# Patient Record
Sex: FEMALE | Race: WHITE | Employment: OTHER | URBAN - NONMETROPOLITAN AREA
[De-identification: names, ages, dates, MRNs, and addresses within clinical notes are randomized per-mention and may not be internally consistent; named-entity substitution may affect disease eponyms.]

---

## 2014-10-09 LAB
CREATININE, URINE: NORMAL
MICROALBUMIN/CREAT 24H UR: 2.9 MG/G{CREAT}
MICROALBUMIN/CREAT UR-RTO: NORMAL

## 2018-03-02 LAB — DIABETIC RETINOPATHY: NORMAL

## 2018-11-06 LAB
AVERAGE GLUCOSE: NORMAL
CHOLESTEROL, TOTAL: 129 MG/DL
CHOLESTEROL/HDL RATIO: 2.5
HBA1C MFR BLD: 6 %
HDLC SERPL-MCNC: 52 MG/DL (ref 35–70)
LDL CHOLESTEROL CALCULATED: 55 MG/DL (ref 0–160)
TRIGL SERPL-MCNC: 134 MG/DL
VLDLC SERPL CALC-MCNC: NORMAL MG/DL

## 2019-03-12 VITALS
BODY MASS INDEX: 31.67 KG/M2 | DIASTOLIC BLOOD PRESSURE: 80 MMHG | HEIGHT: 68 IN | WEIGHT: 209 LBS | TEMPERATURE: 97.8 F | SYSTOLIC BLOOD PRESSURE: 130 MMHG | HEART RATE: 68 BPM

## 2019-03-12 RX ORDER — LEVOTHYROXINE SODIUM 0.1 MG/1
100 TABLET ORAL DAILY
COMMUNITY
End: 2019-06-24 | Stop reason: SDUPTHER

## 2019-03-12 RX ORDER — ASCORBIC ACID 500 MG
500 TABLET ORAL DAILY
COMMUNITY

## 2019-03-12 RX ORDER — LATANOPROST 50 UG/ML
SOLUTION/ DROPS OPHTHALMIC NIGHTLY
COMMUNITY
End: 2019-11-22

## 2019-03-12 RX ORDER — DORZOLAMIDE HYDROCHLORIDE AND TIMOLOL MALEATE 20; 5 MG/ML; MG/ML
SOLUTION/ DROPS OPHTHALMIC 2 TIMES DAILY
COMMUNITY
End: 2020-02-13

## 2019-03-12 RX ORDER — SIMVASTATIN 20 MG
20 TABLET ORAL NIGHTLY
COMMUNITY
End: 2019-06-24 | Stop reason: SDUPTHER

## 2019-05-10 ENCOUNTER — TELEPHONE (OUTPATIENT)
Dept: FAMILY MEDICINE CLINIC | Age: 69
End: 2019-05-10

## 2019-05-10 DIAGNOSIS — E55.9 VITAMIN D DEFICIENCY: ICD-10-CM

## 2019-05-10 DIAGNOSIS — R73.01 IMPAIRED FASTING BLOOD SUGAR: ICD-10-CM

## 2019-05-10 DIAGNOSIS — E04.9 THYROID GOITER: ICD-10-CM

## 2019-05-10 DIAGNOSIS — E78.2 MIXED HYPERLIPIDEMIA: Primary | ICD-10-CM

## 2019-05-10 NOTE — TELEPHONE ENCOUNTER
Returned call to patient. Pt said she went to lab Monday, there wasn't an order, so didn't have labs done.

## 2019-05-13 ENCOUNTER — HOSPITAL ENCOUNTER (OUTPATIENT)
Age: 69
Discharge: HOME | End: 2019-05-15
Payer: COMMERCIAL

## 2019-05-13 ENCOUNTER — OFFICE VISIT (OUTPATIENT)
Dept: FAMILY MEDICINE CLINIC | Age: 69
End: 2019-05-13
Payer: COMMERCIAL

## 2019-05-13 VITALS
RESPIRATION RATE: 16 BRPM | TEMPERATURE: 97.8 F | OXYGEN SATURATION: 96 % | WEIGHT: 214 LBS | HEART RATE: 74 BPM | BODY MASS INDEX: 33.59 KG/M2 | DIASTOLIC BLOOD PRESSURE: 78 MMHG | HEIGHT: 67 IN | SYSTOLIC BLOOD PRESSURE: 112 MMHG

## 2019-05-13 DIAGNOSIS — E03.9 ACQUIRED HYPOTHYROIDISM: ICD-10-CM

## 2019-05-13 DIAGNOSIS — H40.9 GLAUCOMA OF RIGHT EYE, UNSPECIFIED GLAUCOMA TYPE: ICD-10-CM

## 2019-05-13 DIAGNOSIS — J30.2 SEASONAL ALLERGIES: ICD-10-CM

## 2019-05-13 DIAGNOSIS — E04.9 THYROID GOITER: ICD-10-CM

## 2019-05-13 DIAGNOSIS — R73.01 IMPAIRED FASTING BLOOD SUGAR: ICD-10-CM

## 2019-05-13 DIAGNOSIS — E78.2 MIXED HYPERLIPIDEMIA: ICD-10-CM

## 2019-05-13 DIAGNOSIS — E55.9 VITAMIN D DEFICIENCY: Primary | ICD-10-CM

## 2019-05-13 DIAGNOSIS — E55.9 VITAMIN D DEFICIENCY: ICD-10-CM

## 2019-05-13 PROBLEM — E78.5 HYPERLIPIDEMIA: Status: ACTIVE | Noted: 2019-05-13

## 2019-05-13 LAB
ALBUMIN SERPL-MCNC: 4.6 G/DL (ref 3.5–5.2)
ALP BLD-CCNC: 78 U/L (ref 35–104)
ALT SERPL-CCNC: 19 U/L (ref 0–32)
ANION GAP SERPL CALCULATED.3IONS-SCNC: 15 MMOL/L (ref 7–16)
AST SERPL-CCNC: 21 U/L (ref 0–31)
BASOPHILS ABSOLUTE: 0.03 E9/L (ref 0–0.2)
BASOPHILS RELATIVE PERCENT: 0.7 % (ref 0–2)
BILIRUB SERPL-MCNC: 0.7 MG/DL (ref 0–1.2)
BUN BLDV-MCNC: 17 MG/DL (ref 8–23)
CALCIUM SERPL-MCNC: 9.7 MG/DL (ref 8.6–10.2)
CHLORIDE BLD-SCNC: 104 MMOL/L (ref 98–107)
CHOLESTEROL, TOTAL: 140 MG/DL (ref 0–199)
CO2: 22 MMOL/L (ref 22–29)
CREAT SERPL-MCNC: 0.9 MG/DL (ref 0.5–1)
EOSINOPHILS ABSOLUTE: 0.07 E9/L (ref 0.05–0.5)
EOSINOPHILS RELATIVE PERCENT: 1.7 % (ref 0–6)
GFR AFRICAN AMERICAN: >60
GFR NON-AFRICAN AMERICAN: >60 ML/MIN/1.73
GLUCOSE BLD-MCNC: 155 MG/DL (ref 74–99)
HBA1C MFR BLD: 6.2 % (ref 4–5.6)
HCT VFR BLD CALC: 40.3 % (ref 34–48)
HDLC SERPL-MCNC: 51 MG/DL
HEMOGLOBIN: 13 G/DL (ref 11.5–15.5)
IMMATURE GRANULOCYTES #: 0.01 E9/L
IMMATURE GRANULOCYTES %: 0.2 % (ref 0–5)
LDL CHOLESTEROL CALCULATED: 57 MG/DL (ref 0–99)
LYMPHOCYTES ABSOLUTE: 1.24 E9/L (ref 1.5–4)
LYMPHOCYTES RELATIVE PERCENT: 30.7 % (ref 20–42)
MCH RBC QN AUTO: 31.9 PG (ref 26–35)
MCHC RBC AUTO-ENTMCNC: 32.3 % (ref 32–34.5)
MCV RBC AUTO: 99 FL (ref 80–99.9)
MONOCYTES ABSOLUTE: 0.42 E9/L (ref 0.1–0.95)
MONOCYTES RELATIVE PERCENT: 10.4 % (ref 2–12)
NEUTROPHILS ABSOLUTE: 2.27 E9/L (ref 1.8–7.3)
NEUTROPHILS RELATIVE PERCENT: 56.3 % (ref 43–80)
PDW BLD-RTO: 12.5 FL (ref 11.5–15)
PLATELET # BLD: 210 E9/L (ref 130–450)
PMV BLD AUTO: 12.1 FL (ref 7–12)
POTASSIUM SERPL-SCNC: 4.5 MMOL/L (ref 3.5–5)
RBC # BLD: 4.07 E12/L (ref 3.5–5.5)
SODIUM BLD-SCNC: 141 MMOL/L (ref 132–146)
TOTAL PROTEIN: 7.7 G/DL (ref 6.4–8.3)
TRIGL SERPL-MCNC: 158 MG/DL (ref 0–149)
TSH SERPL DL<=0.05 MIU/L-ACNC: 2.65 UIU/ML (ref 0.27–4.2)
VITAMIN D 25-HYDROXY: 77 NG/ML (ref 30–100)
VLDLC SERPL CALC-MCNC: 32 MG/DL
WBC # BLD: 4 E9/L (ref 4.5–11.5)

## 2019-05-13 PROCEDURE — 80061 LIPID PANEL: CPT

## 2019-05-13 PROCEDURE — 90670 PCV13 VACCINE IM: CPT | Performed by: FAMILY MEDICINE

## 2019-05-13 PROCEDURE — 80053 COMPREHEN METABOLIC PANEL: CPT

## 2019-05-13 PROCEDURE — 85025 COMPLETE CBC W/AUTO DIFF WBC: CPT

## 2019-05-13 PROCEDURE — 82306 VITAMIN D 25 HYDROXY: CPT

## 2019-05-13 PROCEDURE — 99214 OFFICE O/P EST MOD 30 MIN: CPT | Performed by: FAMILY MEDICINE

## 2019-05-13 PROCEDURE — 90471 IMMUNIZATION ADMIN: CPT | Performed by: FAMILY MEDICINE

## 2019-05-13 PROCEDURE — 84443 ASSAY THYROID STIM HORMONE: CPT

## 2019-05-13 PROCEDURE — 83036 HEMOGLOBIN GLYCOSYLATED A1C: CPT

## 2019-05-13 RX ORDER — SIMVASTATIN 20 MG
20 TABLET ORAL NIGHTLY
Qty: 90 TABLET | Refills: 1 | Status: CANCELLED | OUTPATIENT
Start: 2019-05-13

## 2019-05-13 RX ORDER — LEVOTHYROXINE SODIUM 0.1 MG/1
100 TABLET ORAL DAILY
Qty: 90 TABLET | Refills: 1 | Status: CANCELLED | OUTPATIENT
Start: 2019-05-13

## 2019-05-13 ASSESSMENT — ENCOUNTER SYMPTOMS
SINUS PAIN: 0
NAUSEA: 0
ABDOMINAL PAIN: 0
CHEST TIGHTNESS: 0
VOMITING: 0
COUGH: 0
BACK PAIN: 0
TROUBLE SWALLOWING: 0
CONSTIPATION: 0
DIARRHEA: 0
SHORTNESS OF BREATH: 0
SORE THROAT: 0
WHEEZING: 0
EYE PAIN: 0

## 2019-05-13 ASSESSMENT — PATIENT HEALTH QUESTIONNAIRE - PHQ9
SUM OF ALL RESPONSES TO PHQ QUESTIONS 1-9: 1
1. LITTLE INTEREST OR PLEASURE IN DOING THINGS: 0
SUM OF ALL RESPONSES TO PHQ9 QUESTIONS 1 & 2: 1
SUM OF ALL RESPONSES TO PHQ QUESTIONS 1-9: 1
2. FEELING DOWN, DEPRESSED OR HOPELESS: 1

## 2019-05-13 NOTE — PROGRESS NOTES
19    Name: Britney Davis  :1950   Sex:female   Age:69 y.o. Chief Complaint   Patient presents with    Hyperlipidemia    Hypothyroidism     Trabeculectomy right eye in the last 2 months    Here for check up, under a lot of stress, her and her  have had to put 1 of there 80 yr old parents in assisted living facilities    Labs done this am so will review with her once done    Thyroid stable in the past   Cholesterol has been stable    Hyperlipidemia   This is a chronic problem. The current episode started more than 1 year ago. The problem is controlled. Recent lipid tests were reviewed and are normal. Exacerbating diseases include hypothyroidism and obesity. Factors aggravating her hyperlipidemia include fatty foods. Pertinent negatives include no chest pain, leg pain, myalgias or shortness of breath. Current antihyperlipidemic treatment includes statins. The current treatment provides significant improvement of lipids. There are no compliance problems. Risk factors for coronary artery disease include dyslipidemia, family history, obesity and stress. Review of Systems   Constitutional: Negative for appetite change, fatigue and fever. HENT: Negative for congestion, ear pain, sinus pain, sore throat and trouble swallowing. Eyes: Negative for pain. Respiratory: Negative for cough, chest tightness, shortness of breath and wheezing. Cardiovascular: Negative for chest pain, palpitations and leg swelling. Gastrointestinal: Negative for abdominal pain, constipation, diarrhea, nausea and vomiting. Endocrine: Negative for cold intolerance and heat intolerance. Genitourinary: Negative for difficulty urinating, hematuria and pelvic pain. Musculoskeletal: Negative for back pain, gait problem, joint swelling and myalgias. Skin: Negative for rash and wound. Neurological: Negative for dizziness, syncope and headaches. Hematological: Negative for adenopathy. Psychiatric/Behavioral: Negative for confusion, sleep disturbance and suicidal ideas. Current Outpatient Medications:     levothyroxine (SYNTHROID) 100 MCG tablet, Take 100 mcg by mouth Daily, Disp: , Rfl:     simvastatin (ZOCOR) 20 MG tablet, Take 20 mg by mouth nightly, Disp: , Rfl:     latanoprost (XALATAN) 0.005 % ophthalmic solution, Place into both eyes nightly , Disp: , Rfl:     Cholecalciferol (VITAMIN D3) 5000 units TABS, Take 5,000 Units by mouth daily, Disp: , Rfl:     dorzolamide-timolol (COSOPT) 22.3-6.8 MG/ML ophthalmic solution, Place into both eyes 2 times daily, Disp: , Rfl:     Ped Multivitamins-Fl-Iron (MULTI-VIT/FLUORIDE/IRON PO), Take by mouth daily, Disp: , Rfl:     vitamin C (ASCORBIC ACID) 500 MG tablet, Take 500 mg by mouth daily, Disp: , Rfl:   No Known Allergies   Past Medical History:   Diagnosis Date    Glaucoma     Headache     migraine    Hyperlipidemia     Hypothyroidism      Patient Active Problem List    Diagnosis Date Noted    Hypothyroidism 05/13/2019    Hyperlipidemia 05/13/2019    Seasonal allergies 05/13/2019    Glaucoma 05/13/2019      Past Surgical History:   Procedure Laterality Date    OVARIAN CYST REMOVAL  1976    TONSILLECTOMY AND ADENOIDECTOMY      TRABECULECTOMY Right 2019      Social History     Tobacco History     Smoking Status  Never Smoker    Smokeless Tobacco Use  Never Used          Alcohol History     Alcohol Use Status  Not Asked Comment  Occasional          Drug Use     Drug Use Status  Not Asked          Sexual Activity     Sexually Active  Not Asked                  /78   Pulse 74   Temp 97.8 °F (36.6 °C)   Resp 16   Ht 5' 7\" (1.702 m)   Wt 214 lb (97.1 kg)   SpO2 96%   BMI 33.52 kg/m²     EXAM:   Physical Exam   Constitutional: She appears well-developed and well-nourished. Obese but very pleasant female   HENT:   Head: Normocephalic and atraumatic. Eyes: Pupils are equal, round, and reactive to light.  EOM are normal.   Neck: Normal range of motion. Cardiovascular: Normal rate and regular rhythm. Pulmonary/Chest: Effort normal and breath sounds normal.   Abdominal: Soft. Bowel sounds are normal.   Musculoskeletal: Normal range of motion. Skin: Skin is warm and dry. Psychiatric: She has a normal mood and affect. Judgment normal.   Nursing note and vitals reviewed. Milan Gibbons was seen today for hyperlipidemia and hypothyroidism. Diagnoses and all orders for this visit:    Vitamin D deficiency  -     Vitamin D 25 Hydroxy; Future    Acquired hypothyroidism  -     TSH without Reflex; Future  -     T4, Free; Future    Mixed hyperlipidemia  -     CBC Auto Differential; Future  -     Comprehensive Metabolic Panel; Future  -     Hemoglobin A1C; Future  -     Lipid Panel; Future    Seasonal allergies    Glaucoma of right eye, unspecified glaucoma type    Impaired fasting blood sugar  -     Hemoglobin A1C; Future    Other orders  -     Pneumococcal conjugate vaccine 13-valent    problems addrssed today  History updated  Health maintance reviewed  prevnar given today  F/u in 6 month        Seen by:   Marisol Tovar DO

## 2019-05-15 ENCOUNTER — TELEPHONE (OUTPATIENT)
Dept: PODIATRY | Age: 69
End: 2019-05-15

## 2019-05-15 NOTE — LETTER
[facility]  [address], [address] [address]  Phone: [phone]  Fax: Marco Scales,         May 20, 2019    Carmen Delacruz  [facility] [facility] [address]      Dear Ramez December:      I have been unable to reach you by phone, we have your test results. Please return our call at your earliest convenience. If you have any questions or concerns, please don't hesitate to call.     Sincerely,        Akua Winston, DO

## 2019-06-24 RX ORDER — LEVOTHYROXINE SODIUM 0.1 MG/1
TABLET ORAL
Qty: 90 TABLET | Refills: 1 | Status: SHIPPED | OUTPATIENT
Start: 2019-06-24 | End: 2019-11-22 | Stop reason: SDUPTHER

## 2019-06-24 RX ORDER — SIMVASTATIN 20 MG
TABLET ORAL
Qty: 90 TABLET | Refills: 1 | Status: SHIPPED | OUTPATIENT
Start: 2019-06-24 | End: 2019-11-22 | Stop reason: SDUPTHER

## 2019-11-15 ENCOUNTER — HOSPITAL ENCOUNTER (OUTPATIENT)
Age: 69
Discharge: HOME | End: 2019-11-17
Payer: COMMERCIAL

## 2019-11-15 DIAGNOSIS — E55.9 VITAMIN D DEFICIENCY: ICD-10-CM

## 2019-11-15 DIAGNOSIS — E03.9 ACQUIRED HYPOTHYROIDISM: ICD-10-CM

## 2019-11-15 DIAGNOSIS — E78.2 MIXED HYPERLIPIDEMIA: ICD-10-CM

## 2019-11-15 DIAGNOSIS — R73.01 IMPAIRED FASTING BLOOD SUGAR: ICD-10-CM

## 2019-11-15 LAB
ALBUMIN SERPL-MCNC: 4.4 G/DL (ref 3.5–5.2)
ALP BLD-CCNC: 80 U/L (ref 35–104)
ALT SERPL-CCNC: 31 U/L (ref 0–32)
ANION GAP SERPL CALCULATED.3IONS-SCNC: 18 MMOL/L (ref 7–16)
AST SERPL-CCNC: 30 U/L (ref 0–31)
BASOPHILS ABSOLUTE: 0.02 E9/L (ref 0–0.2)
BASOPHILS RELATIVE PERCENT: 0.4 % (ref 0–2)
BILIRUB SERPL-MCNC: 0.6 MG/DL (ref 0–1.2)
BUN BLDV-MCNC: 19 MG/DL (ref 8–23)
CALCIUM SERPL-MCNC: 9.6 MG/DL (ref 8.6–10.2)
CHLORIDE BLD-SCNC: 102 MMOL/L (ref 98–107)
CHOLESTEROL, TOTAL: 152 MG/DL (ref 0–199)
CO2: 19 MMOL/L (ref 22–29)
CREAT SERPL-MCNC: 0.9 MG/DL (ref 0.5–1)
EOSINOPHILS ABSOLUTE: 0.08 E9/L (ref 0.05–0.5)
EOSINOPHILS RELATIVE PERCENT: 1.7 % (ref 0–6)
GFR AFRICAN AMERICAN: >60
GFR NON-AFRICAN AMERICAN: >60 ML/MIN/1.73
GLUCOSE BLD-MCNC: 186 MG/DL (ref 74–99)
HBA1C MFR BLD: 8.1 % (ref 4–5.6)
HCT VFR BLD CALC: 39.2 % (ref 34–48)
HDLC SERPL-MCNC: 48 MG/DL
HEMOGLOBIN: 12.5 G/DL (ref 11.5–15.5)
IMMATURE GRANULOCYTES #: 0.01 E9/L
IMMATURE GRANULOCYTES %: 0.2 % (ref 0–5)
LDL CHOLESTEROL CALCULATED: 66 MG/DL (ref 0–99)
LYMPHOCYTES ABSOLUTE: 1.52 E9/L (ref 1.5–4)
LYMPHOCYTES RELATIVE PERCENT: 32.4 % (ref 20–42)
MCH RBC QN AUTO: 31.6 PG (ref 26–35)
MCHC RBC AUTO-ENTMCNC: 31.9 % (ref 32–34.5)
MCV RBC AUTO: 99 FL (ref 80–99.9)
MONOCYTES ABSOLUTE: 0.47 E9/L (ref 0.1–0.95)
MONOCYTES RELATIVE PERCENT: 10 % (ref 2–12)
NEUTROPHILS ABSOLUTE: 2.59 E9/L (ref 1.8–7.3)
NEUTROPHILS RELATIVE PERCENT: 55.3 % (ref 43–80)
PDW BLD-RTO: 12.3 FL (ref 11.5–15)
PLATELET # BLD: 213 E9/L (ref 130–450)
PMV BLD AUTO: 12.6 FL (ref 7–12)
POTASSIUM SERPL-SCNC: 4.6 MMOL/L (ref 3.5–5)
RBC # BLD: 3.96 E12/L (ref 3.5–5.5)
SODIUM BLD-SCNC: 139 MMOL/L (ref 132–146)
T4 FREE: 1.27 NG/DL (ref 0.93–1.7)
TOTAL PROTEIN: 7.6 G/DL (ref 6.4–8.3)
TRIGL SERPL-MCNC: 189 MG/DL (ref 0–149)
TSH SERPL DL<=0.05 MIU/L-ACNC: 6.37 UIU/ML (ref 0.27–4.2)
VITAMIN D 25-HYDROXY: 76 NG/ML (ref 30–100)
VLDLC SERPL CALC-MCNC: 38 MG/DL
WBC # BLD: 4.7 E9/L (ref 4.5–11.5)

## 2019-11-15 PROCEDURE — 85025 COMPLETE CBC W/AUTO DIFF WBC: CPT

## 2019-11-15 PROCEDURE — 83036 HEMOGLOBIN GLYCOSYLATED A1C: CPT

## 2019-11-15 PROCEDURE — 84439 ASSAY OF FREE THYROXINE: CPT

## 2019-11-15 PROCEDURE — 82306 VITAMIN D 25 HYDROXY: CPT

## 2019-11-15 PROCEDURE — 36415 COLL VENOUS BLD VENIPUNCTURE: CPT

## 2019-11-15 PROCEDURE — 80053 COMPREHEN METABOLIC PANEL: CPT

## 2019-11-15 PROCEDURE — 84443 ASSAY THYROID STIM HORMONE: CPT

## 2019-11-15 PROCEDURE — 80061 LIPID PANEL: CPT

## 2019-11-22 ENCOUNTER — OFFICE VISIT (OUTPATIENT)
Dept: FAMILY MEDICINE CLINIC | Age: 69
End: 2019-11-22
Payer: COMMERCIAL

## 2019-11-22 VITALS
TEMPERATURE: 98.4 F | WEIGHT: 220.25 LBS | OXYGEN SATURATION: 96 % | BODY MASS INDEX: 34.57 KG/M2 | HEIGHT: 67 IN | HEART RATE: 78 BPM | SYSTOLIC BLOOD PRESSURE: 120 MMHG | DIASTOLIC BLOOD PRESSURE: 70 MMHG

## 2019-11-22 DIAGNOSIS — E78.2 MIXED HYPERLIPIDEMIA: ICD-10-CM

## 2019-11-22 DIAGNOSIS — E03.9 ACQUIRED HYPOTHYROIDISM: ICD-10-CM

## 2019-11-22 DIAGNOSIS — E08.65 DIABETES MELLITUS DUE TO UNDERLYING CONDITION, UNCONTROLLED, WITH HYPERGLYCEMIA (HCC): Primary | ICD-10-CM

## 2019-11-22 PROCEDURE — 99214 OFFICE O/P EST MOD 30 MIN: CPT | Performed by: FAMILY MEDICINE

## 2019-11-22 RX ORDER — LEVOTHYROXINE SODIUM 0.1 MG/1
TABLET ORAL
Qty: 90 TABLET | Refills: 1 | Status: SHIPPED | OUTPATIENT
Start: 2019-11-22 | End: 2019-11-22 | Stop reason: DRUGHIGH

## 2019-11-22 RX ORDER — LEVOTHYROXINE SODIUM 112 UG/1
112 TABLET ORAL DAILY
Qty: 90 TABLET | Refills: 1 | Status: SHIPPED | OUTPATIENT
Start: 2019-11-22 | End: 2020-05-05 | Stop reason: SDUPTHER

## 2019-11-22 RX ORDER — SIMVASTATIN 20 MG
TABLET ORAL
Qty: 90 TABLET | Refills: 1 | Status: SHIPPED | OUTPATIENT
Start: 2019-11-22 | End: 2020-05-05 | Stop reason: SDUPTHER

## 2019-11-22 RX ORDER — METFORMIN HYDROCHLORIDE 500 MG/1
500 TABLET, EXTENDED RELEASE ORAL 2 TIMES DAILY WITH MEALS
Qty: 180 TABLET | Refills: 0 | Status: SHIPPED | OUTPATIENT
Start: 2019-11-22 | End: 2020-02-13 | Stop reason: SDUPTHER

## 2019-11-22 RX ORDER — METFORMIN HYDROCHLORIDE 500 MG/1
500 TABLET, EXTENDED RELEASE ORAL 2 TIMES DAILY WITH MEALS
Qty: 60 TABLET | Refills: 2 | Status: SHIPPED | OUTPATIENT
Start: 2019-11-22 | End: 2019-11-22 | Stop reason: SDUPTHER

## 2019-11-22 ASSESSMENT — ENCOUNTER SYMPTOMS
WHEEZING: 0
SINUS PAIN: 0
SHORTNESS OF BREATH: 0
EYE REDNESS: 1
EYE PAIN: 0
CHEST TIGHTNESS: 0
VOMITING: 0
SORE THROAT: 0
ABDOMINAL PAIN: 0
TROUBLE SWALLOWING: 0
COUGH: 0
DIARRHEA: 0
NAUSEA: 0
CONSTIPATION: 0
BACK PAIN: 0

## 2019-12-09 RX ORDER — PREDNISOLONE ACETATE 10 MG/ML
SUSPENSION/ DROPS OPHTHALMIC
Refills: 0 | COMMUNITY
Start: 2019-12-06 | End: 2020-05-05

## 2019-12-09 RX ORDER — ACETAZOLAMIDE 250 MG/1
TABLET ORAL
Refills: 0 | COMMUNITY
Start: 2019-12-06 | End: 2020-05-05

## 2019-12-09 RX ORDER — OFLOXACIN 3 MG/ML
SOLUTION/ DROPS OPHTHALMIC
Refills: 0 | COMMUNITY
Start: 2019-12-06 | End: 2020-02-13

## 2020-01-15 ENCOUNTER — TELEPHONE (OUTPATIENT)
Dept: FAMILY MEDICINE CLINIC | Age: 70
End: 2020-01-15

## 2020-02-11 ENCOUNTER — HOSPITAL ENCOUNTER (OUTPATIENT)
Age: 70
Discharge: HOME | End: 2020-02-13
Payer: COMMERCIAL

## 2020-02-11 LAB
ALBUMIN SERPL-MCNC: 4.4 G/DL (ref 3.5–5.2)
ALP BLD-CCNC: 73 U/L (ref 35–104)
ALT SERPL-CCNC: 31 U/L (ref 0–32)
ANION GAP SERPL CALCULATED.3IONS-SCNC: 19 MMOL/L (ref 7–16)
AST SERPL-CCNC: 24 U/L (ref 0–31)
BASOPHILS ABSOLUTE: 0.03 E9/L (ref 0–0.2)
BASOPHILS RELATIVE PERCENT: 0.7 % (ref 0–2)
BILIRUB SERPL-MCNC: 0.5 MG/DL (ref 0–1.2)
BUN BLDV-MCNC: 16 MG/DL (ref 8–23)
CALCIUM SERPL-MCNC: 9.5 MG/DL (ref 8.6–10.2)
CHLORIDE BLD-SCNC: 104 MMOL/L (ref 98–107)
CHOLESTEROL, TOTAL: 139 MG/DL (ref 0–199)
CO2: 19 MMOL/L (ref 22–29)
CREAT SERPL-MCNC: 1 MG/DL (ref 0.5–1)
EOSINOPHILS ABSOLUTE: 0.06 E9/L (ref 0.05–0.5)
EOSINOPHILS RELATIVE PERCENT: 1.4 % (ref 0–6)
GFR AFRICAN AMERICAN: >60
GFR NON-AFRICAN AMERICAN: 55 ML/MIN/1.73
GLUCOSE BLD-MCNC: 142 MG/DL (ref 74–99)
HBA1C MFR BLD: 6.8 % (ref 4–5.6)
HCT VFR BLD CALC: 38.6 % (ref 34–48)
HDLC SERPL-MCNC: 47 MG/DL
HEMOGLOBIN: 12.2 G/DL (ref 11.5–15.5)
IMMATURE GRANULOCYTES #: 0.01 E9/L
IMMATURE GRANULOCYTES %: 0.2 % (ref 0–5)
LDL CHOLESTEROL CALCULATED: 64 MG/DL (ref 0–99)
LYMPHOCYTES ABSOLUTE: 1.58 E9/L (ref 1.5–4)
LYMPHOCYTES RELATIVE PERCENT: 35.7 % (ref 20–42)
MCH RBC QN AUTO: 31.5 PG (ref 26–35)
MCHC RBC AUTO-ENTMCNC: 31.6 % (ref 32–34.5)
MCV RBC AUTO: 99.7 FL (ref 80–99.9)
MONOCYTES ABSOLUTE: 0.43 E9/L (ref 0.1–0.95)
MONOCYTES RELATIVE PERCENT: 9.7 % (ref 2–12)
NEUTROPHILS ABSOLUTE: 2.31 E9/L (ref 1.8–7.3)
NEUTROPHILS RELATIVE PERCENT: 52.3 % (ref 43–80)
PDW BLD-RTO: 12.3 FL (ref 11.5–15)
PLATELET # BLD: 212 E9/L (ref 130–450)
PMV BLD AUTO: 12.1 FL (ref 7–12)
POTASSIUM SERPL-SCNC: 4.3 MMOL/L (ref 3.5–5)
RBC # BLD: 3.87 E12/L (ref 3.5–5.5)
SODIUM BLD-SCNC: 142 MMOL/L (ref 132–146)
T4 FREE: 1.91 NG/DL (ref 0.93–1.7)
TOTAL PROTEIN: 7.5 G/DL (ref 6.4–8.3)
TRIGL SERPL-MCNC: 140 MG/DL (ref 0–149)
TSH SERPL DL<=0.05 MIU/L-ACNC: 0.74 UIU/ML (ref 0.27–4.2)
VLDLC SERPL CALC-MCNC: 28 MG/DL
WBC # BLD: 4.4 E9/L (ref 4.5–11.5)

## 2020-02-11 PROCEDURE — 84439 ASSAY OF FREE THYROXINE: CPT

## 2020-02-11 PROCEDURE — 36415 COLL VENOUS BLD VENIPUNCTURE: CPT

## 2020-02-11 PROCEDURE — 83036 HEMOGLOBIN GLYCOSYLATED A1C: CPT

## 2020-02-11 PROCEDURE — 84443 ASSAY THYROID STIM HORMONE: CPT

## 2020-02-11 PROCEDURE — 85025 COMPLETE CBC W/AUTO DIFF WBC: CPT

## 2020-02-11 PROCEDURE — 80061 LIPID PANEL: CPT

## 2020-02-11 PROCEDURE — 80053 COMPREHEN METABOLIC PANEL: CPT

## 2020-02-13 ENCOUNTER — OFFICE VISIT (OUTPATIENT)
Dept: FAMILY MEDICINE CLINIC | Age: 70
End: 2020-02-13
Payer: COMMERCIAL

## 2020-02-13 VITALS
HEIGHT: 67 IN | WEIGHT: 218.8 LBS | DIASTOLIC BLOOD PRESSURE: 84 MMHG | HEART RATE: 86 BPM | TEMPERATURE: 97.5 F | OXYGEN SATURATION: 98 % | BODY MASS INDEX: 34.34 KG/M2 | SYSTOLIC BLOOD PRESSURE: 126 MMHG

## 2020-02-13 PROCEDURE — 99214 OFFICE O/P EST MOD 30 MIN: CPT | Performed by: FAMILY MEDICINE

## 2020-02-13 RX ORDER — NEOMYCIN SULFATE, POLYMYXIN B SULFATE, AND DEXAMETHASONE 3.5; 10000; 1 MG/G; [USP'U]/G; MG/G
OINTMENT OPHTHALMIC
COMMUNITY
Start: 2019-12-11 | End: 2020-02-13

## 2020-02-13 RX ORDER — METFORMIN HYDROCHLORIDE 500 MG/1
500 TABLET, EXTENDED RELEASE ORAL 2 TIMES DAILY WITH MEALS
Qty: 180 TABLET | Refills: 0 | Status: SHIPPED
Start: 2020-02-13 | End: 2020-05-05 | Stop reason: SDUPTHER

## 2020-02-13 ASSESSMENT — PATIENT HEALTH QUESTIONNAIRE - PHQ9
SUM OF ALL RESPONSES TO PHQ QUESTIONS 1-9: 0
SUM OF ALL RESPONSES TO PHQ9 QUESTIONS 1 & 2: 0
1. LITTLE INTEREST OR PLEASURE IN DOING THINGS: 0
2. FEELING DOWN, DEPRESSED OR HOPELESS: 0
SUM OF ALL RESPONSES TO PHQ QUESTIONS 1-9: 0

## 2020-02-13 ASSESSMENT — ENCOUNTER SYMPTOMS
TROUBLE SWALLOWING: 0
SHORTNESS OF BREATH: 0
BACK PAIN: 0
COUGH: 0
VOMITING: 0
SINUS PAIN: 0
DIARRHEA: 0
WHEEZING: 0
ABDOMINAL PAIN: 0
NAUSEA: 0
EYE PAIN: 0
CHEST TIGHTNESS: 0
SORE THROAT: 0
CONSTIPATION: 0

## 2020-02-13 NOTE — PROGRESS NOTES
levothyroxine (SYNTHROID) 112 MCG tablet, Take 1 tablet by mouth daily, Disp: 90 tablet, Rfl: 1    Cholecalciferol (VITAMIN D3) 5000 units TABS, Take 5,000 Units by mouth daily, Disp: , Rfl:     Ped Multivitamins-Fl-Iron (MULTI-VIT/FLUORIDE/IRON PO), Take by mouth daily, Disp: , Rfl:     vitamin C (ASCORBIC ACID) 500 MG tablet, Take 500 mg by mouth daily, Disp: , Rfl:   No Known Allergies   Past Medical History:   Diagnosis Date    Glaucoma     Headache     migraine    Hyperlipidemia     Hypothyroidism      Patient Active Problem List    Diagnosis Date Noted    Diabetes mellitus due to underlying condition, uncontrolled, with hyperglycemia ([facility] [address] 11/22/2019    Hypothyroidism 05/13/2019    Hyperlipidemia 05/13/2019    Seasonal allergies 05/13/2019    Glaucoma 05/13/2019      Past Surgical History:   Procedure Laterality Date    OVARIAN CYST REMOVAL  1976    TONSILLECTOMY AND ADENOIDECTOMY      TRABECULECTOMY Right 2019      Social History     Tobacco History     Smoking Status  Never Smoker    Smokeless Tobacco Use  Never Used          Alcohol History     Alcohol Use Status  Not Asked Comment  Occasional          Drug Use     Drug Use Status  Not Asked          Sexual Activity     Sexually Active  Not Asked            /84   Pulse 86   Temp 97.5 °F (36.4 °C)   Ht 5' 7\" (1.702 m)   Wt 218 lb 12.8 oz (99.2 kg)   SpO2 98%   BMI 34.27 kg/m²     EXAM:   Physical Exam  Vitals signs and nursing note reviewed. Constitutional:       Appearance: Normal appearance. She is well-developed. HENT:      Head: Normocephalic and atraumatic. Right Ear: Tympanic membrane normal.      Left Ear: Tympanic membrane normal.      Nose: Nose normal.      Mouth/Throat:      Mouth: Mucous membranes are moist.   Eyes:      Pupils: Pupils are equal, round, and reactive to light. Neck:      Musculoskeletal: Normal range of motion. Cardiovascular:      Rate and Rhythm: Normal rate and regular rhythm.

## 2020-05-01 ENCOUNTER — HOSPITAL ENCOUNTER (OUTPATIENT)
Age: 70
Discharge: HOME | End: 2020-05-03
Payer: COMMERCIAL

## 2020-05-01 LAB
ALBUMIN SERPL-MCNC: 4.6 G/DL (ref 3.5–5.2)
ALP BLD-CCNC: 79 U/L (ref 35–104)
ALT SERPL-CCNC: 25 U/L (ref 0–32)
ANION GAP SERPL CALCULATED.3IONS-SCNC: 16 MMOL/L (ref 7–16)
AST SERPL-CCNC: 21 U/L (ref 0–31)
BASOPHILS ABSOLUTE: 0.02 E9/L (ref 0–0.2)
BASOPHILS RELATIVE PERCENT: 0.4 % (ref 0–2)
BILIRUB SERPL-MCNC: 0.6 MG/DL (ref 0–1.2)
BUN BLDV-MCNC: 17 MG/DL (ref 8–23)
CALCIUM SERPL-MCNC: 9.7 MG/DL (ref 8.6–10.2)
CHLORIDE BLD-SCNC: 105 MMOL/L (ref 98–107)
CHOLESTEROL, TOTAL: 146 MG/DL (ref 0–199)
CO2: 23 MMOL/L (ref 22–29)
CREAT SERPL-MCNC: 0.9 MG/DL (ref 0.5–1)
EOSINOPHILS ABSOLUTE: 0.07 E9/L (ref 0.05–0.5)
EOSINOPHILS RELATIVE PERCENT: 1.5 % (ref 0–6)
GFR AFRICAN AMERICAN: >60
GFR NON-AFRICAN AMERICAN: >60 ML/MIN/1.73
GLUCOSE BLD-MCNC: 147 MG/DL (ref 74–99)
HBA1C MFR BLD: 6.7 % (ref 4–5.6)
HCT VFR BLD CALC: 37.5 % (ref 34–48)
HDLC SERPL-MCNC: 44 MG/DL
HEMOGLOBIN: 11.7 G/DL (ref 11.5–15.5)
IMMATURE GRANULOCYTES #: 0.01 E9/L
IMMATURE GRANULOCYTES %: 0.2 % (ref 0–5)
LDL CHOLESTEROL CALCULATED: 64 MG/DL (ref 0–99)
LYMPHOCYTES ABSOLUTE: 1.74 E9/L (ref 1.5–4)
LYMPHOCYTES RELATIVE PERCENT: 37.7 % (ref 20–42)
MCH RBC QN AUTO: 31 PG (ref 26–35)
MCHC RBC AUTO-ENTMCNC: 31.2 % (ref 32–34.5)
MCV RBC AUTO: 99.2 FL (ref 80–99.9)
MONOCYTES ABSOLUTE: 0.52 E9/L (ref 0.1–0.95)
MONOCYTES RELATIVE PERCENT: 11.3 % (ref 2–12)
NEUTROPHILS ABSOLUTE: 2.25 E9/L (ref 1.8–7.3)
NEUTROPHILS RELATIVE PERCENT: 48.9 % (ref 43–80)
PDW BLD-RTO: 12.5 FL (ref 11.5–15)
PLATELET # BLD: 219 E9/L (ref 130–450)
PMV BLD AUTO: 12 FL (ref 7–12)
POTASSIUM SERPL-SCNC: 4.4 MMOL/L (ref 3.5–5)
RBC # BLD: 3.78 E12/L (ref 3.5–5.5)
SODIUM BLD-SCNC: 144 MMOL/L (ref 132–146)
TOTAL PROTEIN: 7.7 G/DL (ref 6.4–8.3)
TRIGL SERPL-MCNC: 188 MG/DL (ref 0–149)
VLDLC SERPL CALC-MCNC: 38 MG/DL
WBC # BLD: 4.6 E9/L (ref 4.5–11.5)

## 2020-05-01 PROCEDURE — 83036 HEMOGLOBIN GLYCOSYLATED A1C: CPT

## 2020-05-01 PROCEDURE — 80061 LIPID PANEL: CPT

## 2020-05-01 PROCEDURE — 85025 COMPLETE CBC W/AUTO DIFF WBC: CPT

## 2020-05-01 PROCEDURE — 80053 COMPREHEN METABOLIC PANEL: CPT

## 2020-05-01 PROCEDURE — 36415 COLL VENOUS BLD VENIPUNCTURE: CPT

## 2020-05-05 ENCOUNTER — OFFICE VISIT (OUTPATIENT)
Dept: PRIMARY CARE CLINIC | Age: 70
End: 2020-05-05
Payer: COMMERCIAL

## 2020-05-05 VITALS
TEMPERATURE: 98 F | WEIGHT: 217.2 LBS | HEIGHT: 67 IN | OXYGEN SATURATION: 98 % | SYSTOLIC BLOOD PRESSURE: 122 MMHG | DIASTOLIC BLOOD PRESSURE: 80 MMHG | HEART RATE: 86 BPM | BODY MASS INDEX: 34.09 KG/M2

## 2020-05-05 PROCEDURE — 99214 OFFICE O/P EST MOD 30 MIN: CPT | Performed by: FAMILY MEDICINE

## 2020-05-05 RX ORDER — LEVOTHYROXINE SODIUM 112 UG/1
112 TABLET ORAL DAILY
Qty: 90 TABLET | Refills: 1 | Status: SHIPPED
Start: 2020-05-05 | End: 2020-10-27 | Stop reason: SDUPTHER

## 2020-05-05 RX ORDER — SIMVASTATIN 20 MG
TABLET ORAL
Qty: 90 TABLET | Refills: 1 | Status: SHIPPED
Start: 2020-05-05 | End: 2020-10-27 | Stop reason: SDUPTHER

## 2020-05-05 RX ORDER — METFORMIN HYDROCHLORIDE 500 MG/1
500 TABLET, EXTENDED RELEASE ORAL 2 TIMES DAILY WITH MEALS
Qty: 180 TABLET | Refills: 1 | Status: SHIPPED
Start: 2020-05-05 | End: 2020-10-27 | Stop reason: SDUPTHER

## 2020-05-05 ASSESSMENT — ENCOUNTER SYMPTOMS
SINUS PAIN: 0
BACK PAIN: 0
DIARRHEA: 0
SHORTNESS OF BREATH: 0
VOMITING: 0
CHEST TIGHTNESS: 0
SORE THROAT: 0
NAUSEA: 0
WHEEZING: 0
EYE PAIN: 0
TROUBLE SWALLOWING: 0
ABDOMINAL PAIN: 0
COUGH: 0
CONSTIPATION: 0

## 2020-05-05 NOTE — PROGRESS NOTES
20    Name: Mary Lanza  :1950   Sex:female   Age:70 y.o. Chief Complaint   Patient presents with    Diabetes    Hyperlipidemia    Hypothyroidism    Arm Pain     Patient presents to office for follow up. She had a fall in February and landed mostly on her right arm. Patient did go to ER for xrays which were negative per patient. She still has pain in the upper right arm with decreased mobility. Patient says the right shoulder aches at night. She denies any hypoglycemic episodes or diarrhea. Patient's mother is in hospice and patient sometimes forgets to take her PM dose of Metformin as the usual time, she asks if she should take it when she remembers even if it is 9pm or later. She no longer is using eye drops, patient has had surgery on her eyes for the glaucoma. She did have labs done. Patient will need refills today. Here for check up today    Diabetes better since metformin increased  She is trying to watch diet  Sugars at home stable    Hyperlipidemia stable with meds    Thyroid stable with last increase    rightshoulder pain  Fell in fbruary and never got to follow up, they had to babysit then the virus hit and now trying to take care of mother  But cannot move arm very well at all  Cannot reach over head or behind her back  She is righthanded and it is starting to limit her  And if she tries to do something pain shoots down forarm        Review of Systems   Constitutional: Negative for appetite change, fatigue and fever. HENT: Negative for congestion, ear pain, sinus pain, sore throat and trouble swallowing. Eyes: Negative for pain. Respiratory: Negative for cough, chest tightness, shortness of breath and wheezing. Cardiovascular: Negative for chest pain, palpitations and leg swelling. Gastrointestinal: Negative for abdominal pain, constipation, diarrhea, nausea and vomiting. Endocrine: Negative for cold intolerance and heat intolerance.    Genitourinary: Negative for

## 2020-05-26 ENCOUNTER — OFFICE VISIT (OUTPATIENT)
Dept: ORTHOPEDIC SURGERY | Age: 70
End: 2020-05-26
Payer: COMMERCIAL

## 2020-05-26 VITALS — BODY MASS INDEX: 32.96 KG/M2 | TEMPERATURE: 98.2 F | HEIGHT: 67 IN | WEIGHT: 210 LBS

## 2020-05-26 PROCEDURE — 99203 OFFICE O/P NEW LOW 30 MIN: CPT | Performed by: NURSE PRACTITIONER

## 2020-06-15 ENCOUNTER — HOSPITAL ENCOUNTER (OUTPATIENT)
Dept: MRI IMAGING | Age: 70
Discharge: HOME | End: 2020-06-17
Payer: MEDICARE

## 2020-06-15 PROCEDURE — 73221 MRI JOINT UPR EXTREM W/O DYE: CPT

## 2020-06-23 ENCOUNTER — OFFICE VISIT (OUTPATIENT)
Dept: ORTHOPEDIC SURGERY | Age: 70
End: 2020-06-23
Payer: MEDICARE

## 2020-06-23 VITALS — HEIGHT: 68 IN | WEIGHT: 210 LBS | TEMPERATURE: 97.6 F | BODY MASS INDEX: 31.83 KG/M2

## 2020-06-23 PROCEDURE — 99213 OFFICE O/P EST LOW 20 MIN: CPT | Performed by: ORTHOPAEDIC SURGERY

## 2020-06-30 ENCOUNTER — EVALUATION (OUTPATIENT)
Dept: PHYSICAL THERAPY | Age: 70
End: 2020-06-30
Payer: MEDICARE

## 2020-06-30 PROCEDURE — 97110 THERAPEUTIC EXERCISES: CPT | Performed by: PHYSICAL THERAPIST

## 2020-06-30 PROCEDURE — 97161 PT EVAL LOW COMPLEX 20 MIN: CPT | Performed by: PHYSICAL THERAPIST

## 2020-06-30 NOTE — PROGRESS NOTES
[address] [place] [address] [facility]   Phone: [phone]   Fax: [phone]           Date:  2020   Patient: Jake Ospina  : 1950  MRN: [id number]  Referring Provider: Torey Demarco MD  [address]  [facility]     Medical Diagnosis:   Diagnosis   R29.898 (ICD-10-CM) - Shoulder weakness   M25.511 (ICD-10-CM) - Acute pain of right shoulder   M75.121 (ICD-10-CM) - Complete tear of right rotator cuff, unspecified whether traumatic          SUBJECTIVE:     Onset date:   2020    Onset[de-identified] Insidious onset    Mechanism of Injury / History: Pt reports tripped over a curb in the dark and fell on R side. Pt reports went to ER but didn't have shoulder pain then, more so had rib pain initially. Pt reports due to babysitting and pandemic didn't get in to see doctor until about 1 month ago. Doctor referred to therapy. Pt reports difficulty raising arm OH to do things like wash hair but reports it is slowly improving. Patient is right handed. Previous PT: none    Medical Management for Current Problem: none    Chief complaint: pain, decreased motion and inability to use arm    Behavior: condition is getting better    Pain: waxing and waning  Current: 0/10     Best: 0/10     Worst:5/10    Aggravated by: reaching overhead, reaching out, reaching behind back, lying on side    Relieved by: medication tylenol     Symptom Type/Quality: aching  Location[de-identified] global shoulder aches and sometimes sharp pain into mid upper arm         Imaging results: Mri Shoulder Right Wo Contrast    Result Date: 6/15/2020  Patient MRN: [id number] : 1950 Age:  79 years Gender: Female Order Date: 6/15/2020 8:59 AM Exam: MRI SHOULDER RIGHT WO CONTRAST Number of Images: 150 views Indication:  M25.511 Right shoulder pain subsequent to a fall 4 months prior pain is ongoing. Comparison: None. Technique: MRI shoulder noncontrast utilizing standard imaging planes and pulse sequences. Findings:  There are mild cystic changes in the humeral head. The acromion is pointed and downward sloping thought to be highly predisposing to rotator cuff injury. There is mild osteoarthritis at the right [facility] joint. There are full thickness tears at the supra and infraspinatus tendons which are  from the greater tuberosity insertion by approximately 4 cm. The respective supraspinatus and infraspinatus muscle bellies are moderately atrophic. Subscapularis and long head biceps tendons are intact. The glenoid labrum is not clearly pathologic. IMPRESSION:  Full-thickness tears of the supra and infraspinatus tendons which are  from the greater tuberosity insertion by approximately 4 cm. Acromial morphology is thought to be highly predisposing to rotator cuff injury. Atrophy of the respective supraspinatus and infraspinatus muscle bellies. Osteoarthritis at the right [facility] joint. Past Medical History:  Past Medical History:   Diagnosis Date    Glaucoma     Headache     migraine    Hyperlipidemia     Hypothyroidism      Past Surgical History:   Procedure Laterality Date    OVARIAN CYST REMOVAL  1976    TONSILLECTOMY AND ADENOIDECTOMY      TRABECULECTOMY Right 2019       Medications:   Current Outpatient Medications   Medication Sig Dispense Refill    simvastatin (ZOCOR) 20 MG tablet take 1 tablet by mouth at bedtime 90 tablet 1    metFORMIN (GLUCOPHAGE-XR) 500 MG extended release tablet Take 1 tablet by mouth 2 times daily (with meals) 180 tablet 1    levothyroxine (SYNTHROID) 112 MCG tablet Take 1 tablet by mouth daily 90 tablet 1    Cholecalciferol (VITAMIN D3) 5000 units TABS Take 5,000 Units by mouth daily      Ped Multivitamins-Fl-Iron (MULTI-VIT/FLUORIDE/IRON PO) Take by mouth daily      vitamin C (ASCORBIC ACID) 500 MG tablet Take 500 mg by mouth daily       No current facility-administered medications for this visit. Occupation: retired.      Exercise regimen: none but walks when she does exercise    Hobbies: reading , going on [place] boat     Patient Goals: return to prior activity, full use of arm    Precautions/Contraindications: Right shoulder massive rotator cuff tear per MRI/chart    OBJECTIVE:     Observations: well nourished female                  Palpation:  Non-tender to palpation      Joint/Motion:  Right Shoulder:  AROM: 50° Forward elevation,  55° abduction, 70° ER,  IR to 50 (tested in neutral)   PROM: 90° Forward elevation, 90° abduction,  70° ER,  55° IR    Left Shoulder:  AROM: 165° Forward elevation, 170° abduction,  90° ER,  IR to 80  PROM: 180° Forward elevation, 180° abduction,  90° ER , 80° IR    Strength:  Right Shoulder: Flexion 2/5,  Abduction 2/5, ER 2/5, IR 2/5   - not formally assessed   Right Elbow:Flexion 4/5,  Extension 4/5    Left Shoulder: Flexion 4/5,  Abduction 4/5, ER 4/5, IR 4/5   Left Elbow:Flexion 5/5,  Extension 5/5      Special Tests/Functional Screens:    [] Ritchie []+ / [] -  [] Pioneer's []+ / [] -   [] AC Sheer []+ / [] -    [] GH drawer []+ / [] -    [] Bicep Load []+ / [] -   [] Crank []+ / [] -  [] Clunk []+ / [] -  [] Yareli Vargas []+ / [] -   [] Taina Payne []+ / [] -  [] Lennie's []+ / [] -      [] Speed's []+ / [] -   [] Kye's []+ / [] -    [] Sulcus Sign []+ / [] -   [] Apprehension []+ / [] -   [] Bicep Load II []+ / [] -   [] Elbow Valgus []+ / [] -     [] Elbow Varus []+ / [] -   [] Carter's relocation []+ / [] -   [x] Empty Can [x]+ / [] -  [x] Drop arm [x]+ / [] -  [] ER lag []+ / [] -  [] Painful Arc []+ / [] -  [] Marly Royal []+ / [] -  [x] Belly Press/ lift off[x]+ / [] -  [] Other: []+ / [] -       Special Tests: Per chart,  MRI shows large tear involving supraspinatus and infraspinatus with retraction to nearly the glenoid. There is grade 3 atrophy of the supraspinatus as well as fatty atrophy of the infraspinatus. Subscapularis appears to be intact.   Biceps appears somewhat abnormal as it leaves the groove.       ASSESSMENT     Outcome Measure:   QuickDASH (Disorders of the Arm, Shoulder, and Hand) 40.91% disability    Problems:    Pain reported 0-5/10   ROM decreased  AROM: 50° Forward elevation,  55° abduction, 70° ER,  IR to 50 (tested in neutral)    Strength decreased 2/5   Decreased functional ability with use of right upper extremity, reaching, lifting, carrying, ADL's    Reason for Skilled Care: Pt presents to therapy with RTC tear shown on MRI, weakness, impaired motion and pain in R UE limiting ADL's and hobbies at home. Pt will benefit from skilled PT to increase strength and ROM of R UE to return to daily activities. Will begin with therapy 2x a week. Due to COVID 19 pandemic when appropriate and pt able to independently work on HEP, will transition to 1x a week and/or virtual visits. [x] There are no barriers affecting plan of care or recovery    [] Barriers to this patient's plan of care or recovery include. Domestic Concerns:  [x] No  [] Yes:    Short Term goals (3 weeks)   Decrease reported pain to 0-3/10   Increase ROM to AROM: 90° Forward elevation,  90° abduction, 70° ER,  IR to 50    Increase Strength to 3+/5    Able to perform/complete the following functions/tasks: pt able to dress herself with minor pain/limitation. Pt able to forward elevate to 90* 10x with minor pain/limitation. Pt able to reach behind back to belt line with minor pain/limitation.  QuickDASH (Disorders of the Arm, Shoulder, and Hand) 30% disability    Long Term goals (6 weeks)   Decrease reported pain to 0-2/10   Increase ROM to AROM: 150° Forward elevation,  150° abduction, 80° ER,  IR to 70   Increase Strength to 4-/5    Able to perform/complete the following functions/tasks: pt able to reach [place] 10x with minor pain/limitation,  Pt able to reach lower lumbar with minor pain/limitation. Pt able to dress herself with no pain/limitation.    QuickDASH 20% disability   Independent with Home Exercise Programs    Rehab Potential: [x] Good  [] Fair  [] Poor    PLAN       Treatment Plan:   [x] Therapeutic Exercise  [x] Therapeutic Activity  [x] Neuromuscular Re-education   [] Gait Training  [] Balance Training  [x] Aerobic conditioning  [x] Manual Therapy  [x] Massage/Fascial release   [] Work/Sport specific activities    [] Pain Neuroscience [x] Cold/hotpack  [] Vasocompression  [x] Electrical Stimulation  [] Lumbar/Cervical Traction  [x] Ultrasound   [] Iontophoresis: 4 mg/mL Dexamethasone Sodium Phosphate 40-80 mAmin  [] Dry Needling      [x] Instruction in HEP      []  Medication allergies reviewed for use of Dexamethasone Sodium Phosphate 4mg/ml  with iontophoresis treatments. Patient is not allergic. The following CPT codes are likely to be used in the care of this patient: 56225 PT Evaluation: Low Complexity , 76485 PT Re-Evaluation , 96332 Therapeutic Exercise , 41188 Neuromuscular Re-Education , 64039 Therapeutic Activities , 85036 Manual Therapy ,  Electrical Stimulation, 69322 US      Suggested Professional Referral: [x] No  [] Yes:     Patient Education:  [x] Plans/Goals, Risks/Benefits discussed  [x] Home exercise program  Method of Education: [x] Verbal  [x] Demo  [x] Written  Comprehension of Education:  [x] Verbalizes understanding. [x] Demonstrates understanding. [] Needs Review. [] Demonstrates/verbalizes understanding of HEP/Ed previously given. Frequency:  2 days per week for 6 weeks    Patient understands diagnosis/prognosis and consents to treatment, plan and goals: [x] Yes    [] No     Thank you for the opportunity to work with your patient. If you have questions or comments, please contact me at numbers listed above. Electronically signed by: Layne Perez, PT PT , DPT [id number]    Medicare Patients Only     Please sign Physician's Certification and return to:   [address] [address]  Dept: [phone]  Dept Fax: [phone]    Physician's Certification / Comments     Frequency/Duration 2 days per week for 6 weeks. Certification period from 6/30/2020  to 8/14/2020. I have reviewed the Plan of Care established for skilled therapy services and certify that the services are required and that they will be provided while the patient is under my care.     Physician's Comments/Revisions:               Physician's Printed Name:                                           [de-identified] Signature:                                                               Date:

## 2020-07-07 ENCOUNTER — TREATMENT (OUTPATIENT)
Dept: PHYSICAL THERAPY | Age: 70
End: 2020-07-07
Payer: MEDICARE

## 2020-07-07 PROCEDURE — 97110 THERAPEUTIC EXERCISES: CPT | Performed by: PHYSICAL THERAPIST

## 2020-07-07 NOTE — PROGRESS NOTES
[address] [facility]   Phone: [phone]   Fax: [phone]      Physical Therapy Daily Treatment Note    Date: 2020  Patient Name: Mckenna Torres  : [birth year]   MRN: [id number]  DOInjury: 2020  DOSx: NA   Referring Provider: No referring provider defined for this encounter. Medical Diagnosis:   Diagnosis   R29.898 (ICD-10-CM) - Shoulder weakness   M25.511 (ICD-10-CM) - Acute pain of right shoulder   M75.121 (ICD-10-CM) - Complete tear of right rotator cuff, unspecified whether traumatic         Outcome Measure:  Quickdash 40.91%    S: Pt reports compliant with HEP and minimal soreness with exercises. O: Pt given written HEP  Time 7148- 6436     Visit  Repeat outcome measure at mid point and end. Pain 0/10     ROM Right Shoulder:  AROM: 50° Forward elevation,  55° abduction, 70° ER,  IR to 50 (tested in neutral)   PROM: 90° Forward elevation, 90° abduction,  70° ER,  55° IR     Modalities            Manual                  Stretch      Table slides 2 x 10 x 10s holds HEP, flex, scaption, abd  TE   Wall Flexion      Wall ER stretch      Towel IR stretch      IR reaching behind back      Exercise      Shrugs AROM      Pendulum Ex      UBE      Pulleys - flex, scaption  2 x 10 x 10s holds  TE   Pulleys-IR      Supine wand chest press 1 x 5  Stopped d/t painful TE   Supine wand flex 1 x 10, 1x 5  TE   Supine wand ER/IR 2 x 10   TE   Supine flexion      S-lying ABD      S-lying ER      Standing wand flex      Standing flexion      Standing ABD            ROWS: H Functional activities  To aid in ROM and strength needed for reaching , lifting ,pushing and pulling at home/work    ROWS: M  \"    ROWS: L  \"    ER  \"    IR  \"                A:  Tolerated well. Abd table slides added to HEP; handout provided. Pt reports increased pain with supine wand chest press thus discontinued for today.     P: Continue with rehab plan  Janine Cifuentes, PT DPT, PT [id number]    Treatment Charges: Mins Units   Initial Evaluation     Re-Evaluation     Ther Exercise         TE 43 3   Manual Therapy     MT     Ther Activities        TA     Gait Training          GT     Neuro Re-education NR     Modalities     Non-Billable Service Time     Other     Total Time/Units 43 3

## 2020-07-09 ENCOUNTER — TREATMENT (OUTPATIENT)
Dept: PHYSICAL THERAPY | Age: 70
End: 2020-07-09
Payer: MEDICARE

## 2020-07-09 PROCEDURE — 97140 MANUAL THERAPY 1/> REGIONS: CPT | Performed by: PHYSICAL THERAPIST

## 2020-07-09 PROCEDURE — 97110 THERAPEUTIC EXERCISES: CPT | Performed by: PHYSICAL THERAPIST

## 2020-07-15 ENCOUNTER — TREATMENT (OUTPATIENT)
Dept: PHYSICAL THERAPY | Age: 70
End: 2020-07-15
Payer: MEDICARE

## 2020-07-15 PROCEDURE — 97110 THERAPEUTIC EXERCISES: CPT | Performed by: PHYSICAL THERAPIST

## 2020-07-15 NOTE — PROGRESS NOTES
[address] [facility]   Phone: [phone]   Fax: [phone]      Physical Therapy Daily Treatment Note    Date: 7/15/2020  Patient Name: Benny Logan  :    MRN: [id number]  DOInjury: 2020  DOSx: NA   Referring Provider: No referring provider defined for this encounter. Medical Diagnosis:   Diagnosis   R29.898 (ICD-10-CM) - Shoulder weakness   M25.511 (ICD-10-CM) - Acute pain of right shoulder   M75.121 (ICD-10-CM) - Complete tear of right rotator cuff, unspecified whether traumatic         Outcome Measure:  Quickdash 40.91%    S: Pt reports feels that when she does table slides it helps her shoulder to loosen up. O: Pt given written HEP  Time 6794- 5088     Visit  Repeat outcome measure at mid point and end.     Pain 0/10     ROM Right Shoulder:  AROM: 50° Forward elevation,  55° abduction, 70° ER,  IR to 50 (tested in neutral)   PROM: 90° Forward elevation, 90° abduction,  70° ER,  55° IR     Modalities            Manual      PROM   Man          Stretch      Table slides  HEP, flex, scaption, abd  TE   Wall Flexion      Wall ER stretch      Towel IR stretch      pec stretch       Posterior capsule stretch       IR reaching behind back      Exercise      Shrugs AROM      Pendulum Ex      UBE      Pulleys - flex, scaption  2 x 10 x 10s holds  TE   Pulleys-IR      Supine wand chest press  Stopped d/t painful TE   Supine wand flex  Stopped d/t painful TE   Supine wand ER/IR 2 x 10   TE   Supine flexion      S-lying ABD 2 x 10 Chicken wing     S-lying ER 2 x 10      Sidelying AA shoulder flexion  2 x 10      IR at 30* abd       Prone full can      Prone row      Prone Hor abd at 90*      Serratus punch       Standing flexion      Standing wand ABD 2 x 10            ROWS: H Functional activities    2x 10   To aid in ROM and strength needed for reaching , lifting ,pushing and pulling at home/work    ROWS: M 2 x 10  OTB \"    ROWS: L  \"    ER 2 x 10 No band/AROM\"    IR 2 x 10  OTB \"                A:  Tolerated well. Pt motivated and making progress. Pt reports pain with attempted wand flexion in supine but tolerable for sidelying AAROM.     P: Continue with rehab plan  Slim Gaspar, PT DPT, PT [id number]    Treatment Charges: Mins Units   Initial Evaluation     Re-Evaluation     Ther Exercise         TE 41 3   Manual Therapy     MT     Ther Activities        TA     Gait Training          GT     Neuro Re-education NR     Modalities     Non-Billable Service Time     Other     Total Time/Units 41 3

## 2020-07-20 ENCOUNTER — TREATMENT (OUTPATIENT)
Dept: PHYSICAL THERAPY | Age: 70
End: 2020-07-20
Payer: MEDICARE

## 2020-07-20 PROCEDURE — 97140 MANUAL THERAPY 1/> REGIONS: CPT | Performed by: PHYSICAL THERAPIST

## 2020-07-20 PROCEDURE — 97110 THERAPEUTIC EXERCISES: CPT | Performed by: PHYSICAL THERAPIST

## 2020-07-20 NOTE — PROGRESS NOTES
[facility] [facility]   Phone: [phone]   Fax: [phone]      Physical Therapy Daily Treatment Note    Date: 2020  Patient Name: Dianna Councilman  : [age]   MRN: [id number]  [facility]: 2020  DOSx: NA   Referring Provider: Lavell Francis MD  [address]     Medical Diagnosis:   Diagnosis   R29.898 (ICD-10-CM) - Shoulder weakness   M25.511 (ICD-10-CM) - Acute pain of right shoulder   M75.121 (ICD-10-CM) - Complete tear of right rotator cuff, unspecified whether traumatic         Outcome Measure:  Maggie Chong 40.91%    S: Pt reports excited that she is able to touch the top of her head and wasn't able to a week ago. O: Pt given written HEP  Time 2532-1485     Visit  Repeat outcome measure at mid point and end.     Pain 0/10     ROM Right Shoulder:  AROM: 50° Forward elevation,  55° abduction, 70° ER,  IR to 50 (tested in neutral)   PROM: 90° Forward elevation, 90° abduction,  70° ER,  55° IR     Modalities            Manual      PROM 10 min  Man          Stretch      Table slides  HEP, flex, scaption, abd  TE   Wall Flexion      Wall ER stretch      Towel IR stretch      pec stretch       Posterior capsule stretch       IR reaching behind back      Exercise      Shrugs AROM      Pendulum Ex      UBE      Pulleys - flex, scaption  2 x 10 x 10s holds  TE   Pulleys-IR      Supine wand chest press  Stopped d/t painful TE   Supine wand flex  Stopped d/t painful TE   Supine wand ER/IR 2 x 10   TE   Supine flexion      S-lying ABD 2 x 10 Chicken wing     S-lying ER 3 x 10      Sidelying AA shoulder flexion  2 x 10      IR at 30* abd       Prone full can      Prone row      Prone Hor abd at 90*      scap sets      Shoulder iso's  2 x 10 x 5s holds  Added to HEP    Serratus punch       Standing flexion      Standing wand ABD 2 x 10      Bicep curls  2 x 10  2#, 2 positions    ROWS: H Functional activities      To aid in ROM and strength needed for

## 2020-07-22 ENCOUNTER — TREATMENT (OUTPATIENT)
Dept: PHYSICAL THERAPY | Age: 70
End: 2020-07-22
Payer: MEDICARE

## 2020-07-22 PROCEDURE — 97110 THERAPEUTIC EXERCISES: CPT | Performed by: PHYSICAL THERAPIST

## 2020-07-22 NOTE — PROGRESS NOTES
[address] [facility]   Phone: [phone]   Fax: [phone]      Physical Therapy Daily Treatment Note    Date: 2020  Patient Name: Fabiana Cowan  : 7[birth year]   MRN: [id number]  DOInjury: 2020  DOSx: NA   Referring Provider: No referring provider defined for this encounter. Medical Diagnosis:   Diagnosis   R29.898 (ICD-10-CM) - Shoulder weakness   M25.511 (ICD-10-CM) - Acute pain of right shoulder   M75.121 (ICD-10-CM) - Complete tear of right rotator cuff, unspecified whether traumatic         Outcome Measure:  Quickdash 40.91%    S: Pt reports after last session was sore but not until it was time to go to sleep and had some trouble sleeping but was fine the next day. Pt reports no pain at rest and 1/10 pain with activity today. O: Pt given written HEP  Time 7096- 8734     Visit  Repeat outcome measure at mid point and end.     Pain 0-1/10     ROM Right Shoulder:  AROM: 50° Forward elevation,  55° abduction, 70° ER,  IR to 50 (tested in neutral)   PROM: 90° Forward elevation, 90° abduction,  70° ER,  55° IR     Modalities            Manual      PROM   Man          Stretch      Table slides  HEP, flex, scaption, abd  TE   Wall Flexion      Wall ER stretch      Towel IR stretch      pec stretch       Posterior capsule stretch       IR reaching behind back      Exercise      Shrugs AROM      Pendulum Ex      UBE      Pulleys - flex, scaption  2 x 10 x 10s holds  TE   Pulleys-IR      Supine wand chest press  Stopped d/t painful TE   Supine wand flex 2 x 10 x 10 s hold HEP TE   Supine wand ER/IR 2 x 10   TE   Supine flexion      S-lying ABD 2 x 10  1x 10  Chicken wing,    Straight arm/ partial ROM    S-lying ER 3 x 10      Sidelying AA shoulder flexion  2 x 10      IR at 30* abd       Prone full can 2 x 10  Partial ROM     Prone row 2 x 10  AROM    Prone Hor abd at 90*      scap sets      Shoulder iso's  2 x 10 x 5s holds  Added to HEP    Serratus punch       Standing flexion      Standing wand ABD 2 x 10      Bicep curls   1#, 2 positions    ROWS: H Functional activities      To aid in ROM and strength needed for reaching , lifting ,pushing and pulling at home/work    ROWS: M 3 x 10 OTB \"    ROWS: L \"    ER 2 x 10OTB\"    IR 3 x 10 OTB \"                A:  Tolerated well. Wand Shoulder flexion stretch not painful today per pt. Added to HEP; handout provided. Pt reports feels good end of session.     P: Continue with rehab plan  Fabiana Leos, PT DPT, PT [id number]    Treatment Charges: Mins Units   Initial Evaluation     Re-Evaluation     Ther Exercise         TE 39 3   Manual Therapy     MT     Ther Activities        TA     Gait Training          GT     Neuro Re-education NR     Modalities     Non-Billable Service Time     Other     Total Time/Units 39 3

## 2020-07-27 ENCOUNTER — TREATMENT (OUTPATIENT)
Dept: PHYSICAL THERAPY | Age: 70
End: 2020-07-27
Payer: MEDICARE

## 2020-07-27 PROCEDURE — 97110 THERAPEUTIC EXERCISES: CPT | Performed by: PHYSICAL THERAPIST

## 2020-07-27 NOTE — PROGRESS NOTES
[facility]   Phone: [phone]   Fax: [phone]      Physical Therapy Daily Treatment Note    Date: 2020  Patient Name: Barrett Sosa  :    MRN: [id number]  DOInjury: 2020  DOSx: NA   Referring Provider: No referring provider defined for this encounter. Medical Diagnosis:   Diagnosis   R29.898 (ICD-10-CM) - Shoulder weakness   M25.511 (ICD-10-CM) - Acute pain of right shoulder   M75.121 (ICD-10-CM) - Complete tear of right rotator cuff, unspecified whether traumatic         Outcome Measure:  Quickdash 40.91%    S: Pt reports no pain today and doing well at home. O: Pt given written HEP  Time 0845- [id number]     Visit  Repeat outcome measure at mid point and end.     Pain 0/10     ROM Right Shoulder:  AROM: 50° Forward elevation,  55° abduction, 70° ER,  IR to 50 (tested in neutral)   PROM: 90° Forward elevation, 90° abduction,  70° ER,  55° IR     Modalities            Manual      PROM   Man          Stretch      Table slides  HEP, flex, scaption, abd  TE   Wall Flexion      Wall ER stretch      Towel IR stretch      pec stretch       Posterior capsule stretch       IR reaching behind back      Exercise      Shrugs AROM      Pendulum Ex      UBE      Pulleys - flex, scaption  2 x 10 x 10s holds  TE   Pulleys-IR      Supine wand chest press 2 x 10   TE   Supine wand flex 2 x 10 x 10 s hold HEP TE   Supine wand ER/IR 2 x 10   TE   Supine flexion      S-lying ABD 2 x 10   Chicken wing,        S-lying ER 3 x 10      Sidelying AA shoulder flexion       IR at 30* abd       Prone full can 2 x 10  Partial ROM     Prone row 3 x 10  AROM    Prone Hor abd at 90*      scap sets      Shoulder iso's  2 x 10 x 5s holds  Added to HEP    Serratus punch       Standing flexion      Standing wand ABD 2 x 10      Bicep curls  2 x 10  2#, 2 positions, HEP     ROWS: H Functional activities      To aid in ROM and strength needed for reaching , lifting ,pushing and pulling at home/work    ROWS: M 3 x 10 OTB \"    ROWS: L \"    ER 3 x 10OTB\"    IR 3 x 10 OTB \"                A:  Tolerated well. P: Continue with rehab plan  Update measurements next session.    Kosta Dia, PT DPT, [place] [id number]    Treatment Charges: Mins Units   Initial Evaluation     Re-Evaluation     Ther Exercise         TE 43 3   Manual Therapy     MT     Ther Activities        TA     Gait Training          GT     Neuro Re-education NR     Modalities     Non-Billable Service Time     Other     Total Time/Units 43 3

## 2020-07-29 ENCOUNTER — TREATMENT (OUTPATIENT)
Dept: PHYSICAL THERAPY | Age: 70
End: 2020-07-29
Payer: MEDICARE

## 2020-07-29 PROCEDURE — 97110 THERAPEUTIC EXERCISES: CPT | Performed by: PHYSICAL THERAPIST

## 2020-07-29 NOTE — PROGRESS NOTES
[address] [facility]   Phone: [phone]   Fax: [phone]      Physical Therapy Daily Treatment Note    Date: 2020   Patient Name: Cherrie Cesar  : [birth year]   MRN: [id number]  DOInjury: 2020  DOSx: NA   Referring Provider: No referring provider defined for this encounter. Medical Diagnosis:   Diagnosis   R29.898 (ICD-10-CM) - Shoulder weakness   M25.511 (ICD-10-CM) - Acute pain of right shoulder   M75.121 (ICD-10-CM) - Complete tear of right rotator cuff, unspecified whether traumatic         Outcome Measure:  Quickdash 40.91%    S: Pt reports no pain today and doing well at home. O: Pt given written HEP  Time 0873- 0930     Visit  Repeat outcome measure at mid point and end.     Pain 0/10     ROM Right Shoulder:  AROM: 50° Forward elevation,  55° abduction, 70° ER,  IR to 50 (tested in neutral)   PROM: 90° Forward elevation, 90° abduction,  70° ER,  55° IR     Modalities            Manual      PROM   Man          Stretch      Table slides  HEP, flex, scaption, abd  TE   Wall Flexion      Wall ER stretch      Towel IR stretch      pec stretch       Posterior capsule stretch       IR reaching behind back      Exercise      Shrugs AROM      Pendulum Ex      UBE      Pulleys - flex, scaption  2 x 10 x 10s holds  TE   Pulleys-IR      Supine wand chest press 2 x 10   TE   Supine wand flex 2 x 10 x 10 s hold HEP TE   Supine wand ER/IR 2 x 10   TE   Supine flexion      S-lying ABD 2 x 10   Chicken wing,        S-lying ER 3 x 10      Sidelying AA shoulder flexion       IR at 30* abd       Prone full can Partial ROM     Prone row AROM    Prone Hor abd at 90*      scap sets      Shoulder iso's  2 x 10 x 5s holds  Added to HEP    Wall crawls (flexion )  1 x 10   TE   Serratus punch       Standing flexion      Standing wand ABD 2 x 10      Bicep curls  2 x 10  2#, 2 positions, HEP     ROWS: H Functional activities      To aid in ROM and strength needed for reaching ,

## 2020-08-05 ENCOUNTER — TREATMENT (OUTPATIENT)
Dept: PHYSICAL THERAPY | Age: 70
End: 2020-08-05
Payer: MEDICARE

## 2020-08-05 PROCEDURE — 97110 THERAPEUTIC EXERCISES: CPT | Performed by: PHYSICAL THERAPIST

## 2020-08-05 NOTE — PROGRESS NOTES
[address] [facility]   Phone: [phone]   Fax: [phone]      Physical Therapy Daily Treatment Note    Date: 2020   Patient Name: Leanne Little  :    MRN: [id number]  DOInjury: 2020  DOSx: NA   Referring Provider: Yolanda Fallon MD  [address]     Medical Diagnosis:   Diagnosis   R29.898 (ICD-10-CM) - Shoulder weakness   M25.511 (ICD-10-CM) - Acute pain of right shoulder   M75.121 (ICD-10-CM) - Complete tear of right rotator cuff, unspecified whether traumatic         Outcome Measure:  Quickdash 40.91%    S: Pt reports improving with wall crawls and feels able to reach higher than previously. O: Pt given written HEP  Time 2730- 6547     Visit  Repeat outcome measure at mid point and end.     Pain 0/10     ROM Right Shoulder:  AROM: 55° Forward elevation,  60° abduction, 45° ER,  IR to 60 (tested at 30* abd)   PROM: 134° Forward elevation, 115° abduction,  70° ER,  55° IR         Modalities            Manual      PROM   Man          Stretch      Table slides  HEP, flex, scaption, abd  TE   Wall Flexion      Wall ER stretch      Towel IR stretch      pec stretch       Posterior capsule stretch       IR reaching behind back      Exercise      Shrugs AROM      Pendulum Ex      UBE      Pulleys - flex, scaption  2 x 10 x 10s holds  TE   Pulleys-IR      Supine wand chest press 2 x 10   TE   Supine wand flex 2 x 10 x 10 s hold HEP TE   Supine wand ER/IR 2 x 10   TE   Supine flexion      S-lying ABD Chicken wing,        S-lying ER     Sidelying AA shoulder flexion       IR at 30* abd       Prone full can Partial ROM     Prone row AROM    Prone Hor abd at 90*      scap sets      Shoulder iso's  2 x 10 x 5s holds  Added to HEP    Wall crawls (flexion )  1 x 10   TE   Serratus punch       Standing flexion      Standing wand ABD 2 x 10      Bicep curls  2 x 10  3#, 2 positions, HEP     ROWS: H Functional activities      To aid in ROM and strength needed for reaching , lifting ,pushing and pulling at home/work    ROWS: M 3 x 10 OTB \"    ROWS: L \"    ER 3 x 10OTB\"    IR 3 x 10 OTB \"                A:  Tolerated well. Measurements updated today. PROM shows greater improvements than AROM. Pt demonstrates significant compensatory strategies attempting standing shoulder flexion. Pt instructed to try shoulder flexion within available range in front of mirror at home to work on for HEP. Pt demonstrates understanding. P: Continue with rehab plan.     Mao Bernabe, PT DPT, [place] [id number]    Treatment Charges: Mins Units   Initial Evaluation     Re-Evaluation     Ther Exercise         TE 44 3   Manual Therapy     MT     Ther Activities        TA     Gait Training          GT     Neuro Re-education NR     Modalities     Non-Billable Service Time     Other     Total Time/Units 44 3

## 2020-08-10 ENCOUNTER — TREATMENT (OUTPATIENT)
Dept: PHYSICAL THERAPY | Age: 70
End: 2020-08-10
Payer: MEDICARE

## 2020-08-10 PROCEDURE — 97110 THERAPEUTIC EXERCISES: CPT | Performed by: PHYSICAL THERAPIST

## 2020-08-10 NOTE — PROGRESS NOTES
[address] [facility]   Phone: [phone]   Fax: [phone]      Physical Therapy Daily Treatment Note    Date: 8/10/2020   Patient Name: David Santiago  : [age]   MRN: [id number]  DOInjury: 2020  DOSx: NA   Referring Provider: No referring provider defined for this encounter. Medical Diagnosis:   Diagnosis   R29.898 (ICD-10-CM) - Shoulder weakness   M25.511 (ICD-10-CM) - Acute pain of right shoulder   M75.121 (ICD-10-CM) - Complete tear of right rotator cuff, unspecified whether traumatic         Outcome Measure:  Quickdash 40.91%    S: Pt reports no pain today. Pt reports her friends are commenting and noticing that she is able to do more with her arm than she could before. Pt reports noticing as well; states now using both arms to take shirts on /off instead of just 1.      O: Pt given written HEP  Time 0850- 0930     Visit 10/12 Repeat outcome measure at mid point and end.     Pain 0/10     ROM Right Shoulder:  AROM: 55° Forward elevation,  60° abduction, 45° ER,  IR to 60 (tested at 30* abd)   PROM: 134° Forward elevation, 115° abduction,  70° ER,  55° IR         Modalities            Manual      PROM   Man          Stretch      Table slides  HEP, flex, scaption, abd  TE   Wall Flexion      Wall ER stretch      Towel IR stretch      pec stretch       Posterior capsule stretch       IR reaching behind back      Exercise      Shrugs AROM      Pendulum Ex      UBE      Pulleys - flex, scaption  2 x 10 x 10s holds  TE   Pulleys-IR      Supine wand chest press 2 x 10   TE   Supine wand flex 2 x 10 x 10 s hold HEP TE   Supine wand ER/IR 2 x 10   TE   Supine flexion      S-lying ABD 2 x 10   Progressed to straight arm          S-lying ER 3 x 10      Sidelying AA shoulder flexion       IR at 30* abd       Prone full can Partial ROM     Prone row AROM    Prone Hor abd at 90*      scap sets      Shoulder iso's  2 x 10 x 5s holds  Added to HEP    Wall crawls (flexion )  1 x 10 TE   Serratus punch       Standing flexion      Standing wand ABD      Bicep curls  2 x 10  3#, 2 positions, HEP     ROWS: H Functional activities      To aid in ROM and strength needed for reaching , lifting ,pushing and pulling at home/work    ROWS: M OTB \"    ROWS: L \"    ER OTB\"    IR OTB \"                A:  Tolerated well. Pt able to perform shoulder abduction in sidelying with straight arm today instead of 'chicken wing.'  Pt also able to slide hand down wall with wall crawls instead of crawling fingers down wall to prevent UE from lowering too quickly; pt is gaining eccentric control with this activity. P: Continue with rehab plan.     Brenna Quispe, PT DPT, [place] [id number]    Treatment Charges: Mins Units   Initial Evaluation     Re-Evaluation     Ther Exercise         TE 40 3   Manual Therapy     MT     Ther Activities        TA     Gait Training          GT     Neuro Re-education NR     Modalities     Non-Billable Service Time     Other     Total Time/Units 40 3

## 2020-08-11 ENCOUNTER — OFFICE VISIT (OUTPATIENT)
Dept: ORTHOPEDIC SURGERY | Age: 70
End: 2020-08-11
Payer: MEDICARE

## 2020-08-11 ENCOUNTER — TREATMENT (OUTPATIENT)
Dept: PHYSICAL THERAPY | Age: 70
End: 2020-08-11
Payer: MEDICARE

## 2020-08-11 VITALS — HEIGHT: 67 IN | BODY MASS INDEX: 32.96 KG/M2 | WEIGHT: 210 LBS

## 2020-08-11 PROCEDURE — 99213 OFFICE O/P EST LOW 20 MIN: CPT | Performed by: ORTHOPAEDIC SURGERY

## 2020-08-11 PROCEDURE — 97110 THERAPEUTIC EXERCISES: CPT | Performed by: PHYSICAL THERAPIST

## 2020-08-11 NOTE — PROGRESS NOTES
[address] [facility]   Phone: [phone]   Fax: [phone]      Physical Therapy Daily Treatment Note    Date: 2020   Patient Name: Orville Mejía  : 4776   MRN: [id number]  DOInjury: 2020  DOSx: NA   Referring Provider: No referring provider defined for this encounter. Medical Diagnosis:   Diagnosis   R29.898 (ICD-10-CM) - Shoulder weakness   M25.511 (ICD-10-CM) - Acute pain of right shoulder   M75.121 (ICD-10-CM) - Complete tear of right rotator cuff, unspecified whether traumatic         Outcome Measure:  Quickdash 40.91%    S: Pt reports no pain today. O: Pt given written HEP  Time [id number]     Visit  Repeat outcome measure at mid point and end.     Pain 0/10     ROM Right Shoulder:  AROM: 55° Forward elevation,  60° abduction, 45° ER,  IR to 60 (tested at 30* abd)   PROM: 134° Forward elevation, 115° abduction,  70° ER,  55° IR         Modalities            Manual      PROM   Man          Stretch      Table slides 2 x 10 x 10s holds HEP, flex, scaption, abd  TE   Wall Flexion      Wall ER stretch      Towel IR stretch      pec stretch       Posterior capsule stretch       IR reaching behind back      Exercise      Shrugs AROM      Pendulum Ex      UBE      Pulleys - flex, scaption  2 x 10 x 10s holds  TE   Pulleys-IR      Supine wand chest press 2 x 10   TE   Supine wand flex 2 x 10 x 10 s hold HEP TE   Supine wand ER/IR 2 x 10   TE   Supine flexion      S-lying ABD 2 x 10   Progressed to straight arm          S-lying ER 2 x 10      Sidelying AA shoulder flexion       IR at 30* abd       Prone full can Partial ROM     Prone row AROM    Prone Hor abd at 90*      scap sets      Shoulder iso's  2 x 10 x 5s holds  Added to HEP    Wall crawls (flexion )  1 x 5  With eccentric lower  TE   Serratus punch       Standing flexion      Standing wand ABD 2 x 10      Bicep curls  2 x 10  3#, 2 positions, HEP     ROWS: H Functional activities      To aid in ROM and

## 2020-08-11 NOTE — PROGRESS NOTES
Follow Up Visit     Slade Lancaster returns today for follow up visit regarding right shoulder weakness following a fall in February 2020. Treatment thus far has included 6 weeks of formal therapy, with overall good improvement. She reports symptoms are improved. Physical Exam:     No data recorded    General: Alert and oriented x3, no acute distress  Cardiovascular/pulmonary: No labored breathing, peripheral perfusion intact  Musculoskeletal:    Right shoulder exam range of motion 90/L5/60. Complete weakness present on O'Briens and Jobes exams without pain. Speeds exam intact belly press exam intact. No deformity on inspection no tenderness to palpation. Controlled Substances Monitoring:      Imaging:  No new imaging obtained today. Assessment: Right rotator cuff tear of supraspinatus and infraspinatus      Plan: Today we discussed her right shoulder. Patient reports she is made moderate progress with physical therapy. Patient wishes to continue with physical therapy and work on continuing increasing her range of motion. He has no new complaints during today's visit. Discussed with patient that she may plateau with physical therapy at some point and a surgical intervention may be necessary. Patient states that she has adequate range of motion and wishes to try to improve her elevation. Patient will follow-up in 3 months. Indra Brady, [address] [facility]   08/11/20  11:43 AM    Staff Addendum    I have seen and evaluated the patient and agree with the assessment and plan as documented by Indra Brady CNP. I have performed the key components of the history and physical examination and concur with the findings and plan, and have made changes where appropriate/necessary.         Ramu Henry MD  [address]

## 2020-08-17 ENCOUNTER — TREATMENT (OUTPATIENT)
Dept: PHYSICAL THERAPY | Age: 70
End: 2020-08-17
Payer: MEDICARE

## 2020-08-17 PROCEDURE — 97110 THERAPEUTIC EXERCISES: CPT | Performed by: PHYSICAL THERAPIST

## 2020-08-17 PROCEDURE — 97164 PT RE-EVAL EST PLAN CARE: CPT | Performed by: PHYSICAL THERAPIST

## 2020-08-17 NOTE — PROGRESS NOTES
[address] [facility]   Phone: [phone]   Fax: [phone]       Physical Therapy Daily Treatment Note    Date: 2020   Patient Name: Isael Martinez  : [age]   MRN: [id number]  [facility]: 2020  DOSx: NA   Referring Provider: Guillermo Valencia MD  [address] [facility]     Medical Diagnosis:   Diagnosis   R29.898 (ICD-10-CM) - Shoulder weakness   M25.511 (ICD-10-CM) - Acute pain of right shoulder   M75.121 (ICD-10-CM) - Complete tear of right rotator cuff, unspecified whether traumatic         Outcome Measure:  Deuce More 11.36%    S: Pt reports minimal pain/soreness from HEP. O: Pt given written HEP  Time 5138 - 1544      Visit  Repeat outcome measure at mid point and end.     Pain 1/10     ROM AROM: 85° Forward elevation,  90° abduction, 70° ER,  IR to 65 (tested at 30* abd )   PROM: 130° Forward elevation, 110° abduction,  70° ER,  70° IR (tested at 90* abd            Modalities            Manual      PROM   Man          Stretch      Table slides  HEP, flex, scaption, abd  TE   Wall Flexion      Wall ER stretch      Towel IR stretch      pec stretch       Posterior capsule stretch       IR reaching behind back      Exercise      Shrugs AROM      Pendulum Ex      UBE      Pulleys - flex, scaption  2 x 10 x 10s holds  TE   Pulleys-IR      Supine wand chest press 2 x 10   TE   Supine wand flex 2 x 10 x 10 s hold HEP TE   Supine wand ER/IR 2 x 10   TE   Supine flexion      S-lying ABD Progressed to straight arm          S-lying ER     Sidelying AA shoulder flexion       IR at 30* abd       Prone full can Partial ROM     Prone row AROM    Prone Hor abd at 90*      scap sets      Shoulder iso's  2 x 10 x 5s holds  Added to HEP    Wall crawls (flexion )  1 x 5  With eccentric lower  TE   Serratus punch       Standing flexion      Standing wand ABD     Bicep curls  3#, 2 positions, HEP     ROWS: H Functional activities      To aid in ROM and strength needed for reaching , lifting ,pushing and pulling at home/work    ROWS: M 3 x 10  OTB \"    ROWS: L  \"    ER 3 x 10 OTB\"    IR 3 x 10  OTB \"                A:  Tolerated well. Reassessment completed today. See note for details. P: Will plan to continue therapy 1-2x week for 6 weeks.    Indu Degroot, PT DPT, [place] [id number]    Treatment Charges: Mins Units   Initial Evaluation     Re-Evaluation 15 1   Ther Exercise         TE 30 2   Manual Therapy     MT     Ther Activities        TA     Gait Training          GT     Neuro Re-education NR     Modalities     Non-Billable Service Time     Other     Total Time/Units 45 3

## 2020-08-17 NOTE — PROGRESS NOTES
[address] [facility]   Phone: [phone]   Fax: [phone]        Referring Provider: Pamela Rowell MD  [address] [facility]     Medical Diagnosis:   R29.898 (ICD-10-CM) - Shoulder weakness   M25.511 (ICD-10-CM) - Acute pain of right shoulder   M75.121 (ICD-10-CM) - Complete tear of right rotator cuff, unspecified whether traumatic         CERTIFICATION PERIOD:  6/30/2020  to 8/14/2020. ATTENDANCE:  Patient has attended 15 of 12 scheduled treatments from 6/30/20202   to 8/17/2020. TREATMENTS RECEIVED:  Therapeutic exercise, manual therapy     INITIAL STATUS:  Observations: well nourished female                                                       Palpation:  Non-tender to palpation       Joint/Motion:  Right Shoulder:  AROM: 50° Forward elevation,  55° abduction, 70° ER,  IR to 50 (tested in neutral)   PROM: 90° Forward elevation, 90° abduction,  70° ER,  55° IR     Left Shoulder:  AROM: 165° Forward elevation, 170° abduction,  90° ER,  IR to 80  PROM: 180° Forward elevation, 180° abduction,  90° ER , 80° IR     Strength:  Right Shoulder: Flexion 2/5,  Abduction 2/5, ER 2/5, IR 2/5   - not formally assessed   Right Elbow:Flexion 4/5,  Extension 4/5     Left Shoulder: Flexion 4/5,  Abduction 4/5, ER 4/5, IR 4/5   Left Elbow:Flexion 5/5,  Extension 5/5        Special Tests/Functional Screens:    []? Sohan-Gavin []?+ / []? -  []? Doland's []?+ / []? -   []? AC Sheer []?+ / []? -    []? [address] drawer []?+ / []? -    []? Bicep Load []?+ / []? -   []? Crank []?+ / []? -  []? Clunk []?+ / []? -  []? [facility] []?+ / []? -   []? Kentrell Members []?+ / []? -  []? Neer's []?+ / []? -      []? Speed's []?+ / []? -   []? Kye's []?+ / []? -    []? Sulcus Sign []?+ / []? -   []? Apprehension []?+ / []? -   []? Bicep Load II []?+ / []? -   []? Elbow Valgus []?+ / []? -     []? Elbow Varus []?+ / []? -   []? Carter's relocation []?+ / []? -   [x]? Empty Can [x]?+ / []? -  [x]?  Drop arm [x]?+ / []? -  []? ER lag []?+ / []? -  []? Painful Arc []?+ / []? -  []? Bear Hug []?+ / []? -  [x]? Belly Press/ lift off[x]? + / []? -  []? Other: []?+ / []? -                Special Tests:            Per chart,  MRI shows large tear involving supraspinatus and infraspinatus with retraction to nearly the glenoid.  There is grade 3 atrophy of the supraspinatus as well as fatty atrophy of the infraspinatus.  Subscapularis appears to be intact.  Biceps appears somewhat abnormal as it leaves the groove.         CURRENT STATUS:  Observations: well nourished female                                                       Palpation:  Non-tender to palpation       Joint/Motion:  Right Shoulder:  AROM: 85° Forward elevation,  90° abduction, 70° ER,  IR to 65 (tested at 30* abd )   PROM: 130° Forward elevation, 110° abduction,  70° ER,  70° IR (tested at 90* abd     Left Shoulder:  AROM: 165° Forward elevation, 170° abduction,  90° ER,  IR to 80  PROM: 180° Forward elevation, 180° abduction,  90° ER , 80° IR     Strength:  Right Shoulder: Flexion 2+/5,  Abduction 2+/5, ER 3+/5, IR 4/5     Right Elbow:Flexion 4+/5,  Extension 4+/5     Left Shoulder: Flexion 4/5,  Abduction 4/5, ER 4/5, IR 4/5   Left Elbow:Flexion 5/5,  Extension 5/5        Special Tests/Functional Screens:    []? Parry-Gavin []?+ / []? -  []? Livingston's []?+ / []? -   []? AC Sheer []?+ / []? -    []? [address] drawer []?+ / []? -    []? Bicep Load []?+ / []? -   []? Crank []?+ / []? -  []? Clunk []?+ / []? -  []? [facility] []?+ / []? -   []? Ernie Patton []?+ / []? -  []? Neer's []?+ / []? -      []? Speed's []?+ / []? -   []? Kye's []?+ / []? -    []? Sulcus Sign []?+ / []? -   []? Apprehension []?+ / []? -   []? Bicep Load II []?+ / []? -   []? Elbow Valgus []?+ / []? -     []? Elbow Varus []?+ / []? -   []? Carter's relocation []?+ / []? -   [x]? Empty Can [x]?+ / []? -  []? Drop arm []?+ / []? -  []? ER lag []?+ / []? -  []? Painful Arc []?+ / []? -  []?  Bear Baldev []?+ / []? -  []? Belly Press/ lift off[]? + / []? -  []? Other: []?+ / []? -                Special Tests:            Per chart,  MRI shows large tear involving supraspinatus and infraspinatus with retraction to nearly the glenoid.  There is grade 3 atrophy of the supraspinatus as well as fatty atrophy of the infraspinatus.  Subscapularis appears to be intact.  Biceps appears somewhat abnormal as it leaves the groove.     Short Term goals (3 weeks)  · Decrease reported pain to 0-3/10 (goal met)   · Increase ROM to AROM: 90° Forward elevation,  90° abduction, 70° ER,  IR to 50 (not met)   · Increase Strength to 3+/5 (not met)   · Able to perform/complete the following functions/tasks: pt able to dress herself with minor pain/limitation. Pt able to forward elevate to 90* 10x with minor pain/limitation. Pt able to reach behind back to belt line with minor pain/limitation.   (partial met)   · QuickDASH (Disorders of the Arm, Shoulder, and Hand) 30% disability (goal met)     Long Term goals (6 weeks)  · Decrease reported pain to 0-2/10 (goal met)   · Increase ROM to AROM: 150° Forward elevation,  150° abduction, 80° ER,  IR to 70 (not met)   · Increase Strength to 4-/5 (not met)  · Able to perform/complete the following functions/tasks: pt able to reach [place] 10x with minor pain/limitation,  Pt able to reach lower lumbar with minor pain/limitation. Pt able to dress herself with no pain/limitation. (not met)  · QuickDASH 20% disability (goal met)   · Independent with Home Exercise Programs         OUTCOME MEASURE:  Jessie Flores 11.36%    COMMENTS AND RECOMMENDATIONS:   Pt reports functionally at home that she continues to see improvements. Would like to continue therapy to see what continued gains can be made. Pt has made progress toward short term goals. Pt reports pleased with progress but understands that she will probably plateau with therapy alone at some point. Pt consistent with HEP at home.   Recommend continue therapy

## 2020-08-26 ENCOUNTER — TREATMENT (OUTPATIENT)
Dept: PHYSICAL THERAPY | Age: 70
End: 2020-08-26
Payer: MEDICARE

## 2020-08-26 PROCEDURE — 97530 THERAPEUTIC ACTIVITIES: CPT | Performed by: PHYSICAL THERAPIST

## 2020-08-26 PROCEDURE — 97110 THERAPEUTIC EXERCISES: CPT | Performed by: PHYSICAL THERAPIST

## 2020-08-26 NOTE — PROGRESS NOTES
[address] [facility]   Phone: [phone]   Fax: [phone]       Physical Therapy Daily Treatment Note    Date: 2020   Patient Name: Dianna Councilman  : 1896   MRN: [id number]  [facility]: 2020  DOSx: NA   Referring Provider: Lavell Francis MD  [facility]     Medical Diagnosis:   Diagnosis   R29.898 (ICD-10-CM) - Shoulder weakness   M25.511 (ICD-10-CM) - Acute pain of right shoulder   M75.121 (ICD-10-CM) - Complete tear of right rotator cuff, unspecified whether traumatic         Outcome Measure:  Maggie Schwarz 11.36%    S: Pt reports no pain today. O: Pt given written HEP  Time 1029- 3097     Visit   1 Repeat outcome measure at mid point and end.     Pain 0/10     ROM AROM: 85° Forward elevation,  90° abduction, 70° ER,  IR to 65 (tested at 30* abd )   PROM: 130° Forward elevation, 110° abduction,  70° ER,  70° IR (tested at 90* abd            Modalities            Manual      PROM   Man          Stretch      Table slides  HEP, flex, scaption, abd  TE   Wall Flexion      Wall ER stretch      Towel IR stretch      pec stretch       Posterior capsule stretch       IR reaching behind back      Exercise      Shrugs AROM      Pendulum Ex      UBE      Pulleys - flex, scaption  2 x 10 x 10s holds  TE   Pulleys-IR      Supine wand chest press 2 x 10   TE   Supine wand flex 2 x 10 x 10 s hold HEP TE   Supine wand ER/IR 2 x 10   TE   Supine flexion      S-lying ABD 2 x 10Progressed to straight arm          S-lying ER 2 x 10     Sidelying AA shoulder flexion       IR at 30* abd       Prone full can 2 x 10      Prone row 2 x 10  AROM    Prone Hor abd at 90*      scap sets      Shoulder iso's  2 x 10 x 5s holds  Added to HEP    Wall crawls (flexion )  1 x 10   With eccentric lower  TE   Serratus punch       Standing flexion 1 x 10  To approx 90*    Standing wand ABD     Bicep curls  2 x 10 3#, 2 positions, HEP     ROWS: H Functional activities      To

## 2020-09-09 ENCOUNTER — TREATMENT (OUTPATIENT)
Dept: PHYSICAL THERAPY | Age: 70
End: 2020-09-09
Payer: MEDICARE

## 2020-09-09 PROCEDURE — 97110 THERAPEUTIC EXERCISES: CPT | Performed by: PHYSICAL THERAPIST

## 2020-09-09 PROCEDURE — 97530 THERAPEUTIC ACTIVITIES: CPT | Performed by: PHYSICAL THERAPIST

## 2020-09-09 NOTE — PROGRESS NOTES
[address] [facility]   Phone: [phone]   Fax: [phone]       Physical Therapy Daily Treatment Note    Date: 2020   Patient Name: Leanne Little  : [birth year]   MRN: [id number]  DOInjury: 2020  DOSx: NA   Referring Provider: No referring provider defined for this encounter. Medical Diagnosis:   Diagnosis   R29.898 (ICD-10-CM) - Shoulder weakness   M25.511 (ICD-10-CM) - Acute pain of right shoulder   M75.121 (ICD-10-CM) - Complete tear of right rotator cuff, unspecified whether traumatic         Outcome Measure:  Quickdash 11.36%    S: Pt reports no pain today just some soreness. Pt compliant with HEP at home. .     O: Pt given written HEP  Time 9034- 3441     Visit   2 Repeat outcome measure at mid point and end.     Pain 0/10     ROM AROM: 85° Forward elevation,  90° abduction, 70° ER,  IR to 65 (tested at 30* abd )   PROM: 130° Forward elevation, 110° abduction,  70° ER,  70° IR (tested at 90* abd            Modalities            Manual      PROM   Man          Stretch      Table slides  HEP, flex, scaption, abd  TE   Wall Flexion      Wall ER stretch      Towel IR stretch      pec stretch       Posterior capsule stretch       IR reaching behind back      Exercise      Shrugs AROM      Pendulum Ex      UBE      Pulleys - flex, scaption , abd  2 x 10 x 10s holds  TE   Pulleys-IR      Supine wand chest press 2 x 10   TE   Supine wand flex 2 x 10 x 10 s hold HEP TE   Supine wand ER/IR   TE   Supine flexion      S-lying ABD 2 x 10 straight arm          S-lying ER 2 x 10     Sidelying AA shoulder flexion       IR at 30* abd       Prone full can     Prone row AROM    Prone Hor abd at 90*      Standing wand flexion  1 x 10  To approximately 95* ; HEP     Shoulder iso's  2 x 10 x 5s holds  Added to HEP    Wall crawls (flexion )  1 x 8   With eccentric lower  TE   Serratus punch       Standing flexion  To approx 90*    Standing wand ABD     Bicep curls  2 x 10 3#, 2 positions, HEP     ROWS: H Functional activities      To aid in ROM and strength needed for reaching , lifting ,pushing and pulling at home/work    ROWS: M 3 x 10  YTB \" TA   ROWS: L  \"    ER 3 x 10 YTB\" TA   IR 3 x 10  YTB \" TA               A:  Tolerated well. Standing wand flexion added to HEP. Pt demonstrates understanding. P: Continue with POC.    Kosta Dia, PT DPT, [address] [facility] [id number]    Treatment Charges: Mins Units   Initial Evaluation     Re-Evaluation     Ther Exercise         TE 27 2   Manual Therapy     MT     Ther Activities        TA 15 1   Gait Training          GT     Neuro Re-education NR     Modalities     Non-Billable Service Time     Other     Total Time/Units 42 3

## 2020-09-16 ENCOUNTER — TREATMENT (OUTPATIENT)
Dept: PHYSICAL THERAPY | Age: 70
End: 2020-09-16
Payer: MEDICARE

## 2020-09-16 PROCEDURE — 97110 THERAPEUTIC EXERCISES: CPT | Performed by: PHYSICAL THERAPIST

## 2020-09-16 PROCEDURE — 97140 MANUAL THERAPY 1/> REGIONS: CPT | Performed by: PHYSICAL THERAPIST

## 2020-09-16 PROCEDURE — 97530 THERAPEUTIC ACTIVITIES: CPT | Performed by: PHYSICAL THERAPIST

## 2020-09-16 NOTE — PROGRESS NOTES
[address] [facility]   Phone: [phone]   Fax: [phone]       Physical Therapy Daily Treatment Note    Date: 2020   Patient Name: Jolanta Lee  : [birth year]   MRN: [id number]  DOInjury: 2020  DOSx: NA   Referring Provider: Serena Acuña MD  [facility]     Medical Diagnosis:   Diagnosis   R29.898 (ICD-10-CM) - Shoulder weakness   M25.511 (ICD-10-CM) - Acute pain of right shoulder   M75.121 (ICD-10-CM) - Complete tear of right rotator cuff, unspecified whether traumatic         Outcome Measure:  Beatriz Bird 11.36%    S: Pt reports no pain today. O: Pt given written HEP  Time 0822 - 7994     Visit   3 Repeat outcome measure at mid point and end.     Pain 0 /10     ROM AROM: 85° Forward elevation,  90° abduction, 70° ER,  IR to 65 (tested at 30* abd )   PROM: 130° Forward elevation, 110° abduction,  70° ER,  70° IR (tested at 90* abd            Modalities            Manual      PROM 10 min  Man          Stretch      Table slides  HEP, flex, scaption, abd  TE   Wall Flexion      Wall ER stretch      Towel IR stretch      pec stretch       Posterior capsule stretch       IR reaching behind back      Exercise      Shrugs AROM      Pendulum Ex      UBE      Pulleys - flex, scaption , abd  2 x 10 x 10s holds  TE   Pulleys-IR      Supine wand chest press 2 x 10   TE   Supine wand flex 2 x 10 x 10 s hold HEP TE   Supine wand ER/IR   TE   Supine flexion      S-lying ABD 2 x 10 straight arm          S-lying ER 2 x 10     Sidelying AA shoulder flexion       IR at 30* abd       Prone full can 2 x 10      Prone row 2 x 10  2#    Prone Hor abd at 90*      Standing wand flexion   To approximately 95* ; HEP     Shoulder iso's  2 x 10 x 5s holds  Added to HEP    Wall crawls (flexion )   With eccentric lower  TE   Serratus punch supine  2 x 10      Standing flexion  To approx 90*    Standing wand ABD 2 x 10     Bicep curls  2 x 10 3#, 2 positions, HEP ROWS: H Functional activities      To aid in ROM and strength needed for reaching , lifting ,pushing and pulling at home/work    ROWS: M 3 x 10  YTB \" TA   ROWS: L  \"    ER 3 x 10 YTB\" TA   IR 3 x 10  YTB \" TA               A:  Tolerated well. Supine serratus punch added today with good tolerance. With PROM noted pt's motion improving. Will plan to update ROM measurements next session. P: Continue with POC. Update ROM next session.    Indu Degroot, PT DPT, [address] [id number]    Treatment Charges: Mins Units   Initial Evaluation     Re-Evaluation     Ther Exercise         TE 20 1   Manual Therapy     MT 10 1   Ther Activities        TA 15 1   Gait Training          GT     Neuro Re-education NR     Modalities     Non-Billable Service Time     Other     Total Time/Units 45 3

## 2020-09-21 ENCOUNTER — TREATMENT (OUTPATIENT)
Dept: PHYSICAL THERAPY | Age: 70
End: 2020-09-21
Payer: MEDICARE

## 2020-09-21 PROCEDURE — 97530 THERAPEUTIC ACTIVITIES: CPT | Performed by: PHYSICAL THERAPIST

## 2020-09-21 PROCEDURE — 97140 MANUAL THERAPY 1/> REGIONS: CPT | Performed by: PHYSICAL THERAPIST

## 2020-09-21 PROCEDURE — 97110 THERAPEUTIC EXERCISES: CPT | Performed by: PHYSICAL THERAPIST

## 2020-09-21 NOTE — PROGRESS NOTES
[facility] [facility]   Phone: [phone]   Fax: [phone]       Physical Therapy Daily Treatment Note    Date: 2020   Patient Name: Edwin Aleman  : [birth year]   MRN: [id number]  DOInjury: 2020  DOSx: NA   Referring Provider: Rickie Duong MD  [address] [facility]     Medical Diagnosis:   Diagnosis   R29.898 (ICD-10-CM) - Shoulder weakness   M25.511 (ICD-10-CM) - Acute pain of right shoulder   M75.121 (ICD-10-CM) - Complete tear of right rotator cuff, unspecified whether traumatic         Outcome Measure:  Jessie Flaming 11.36%    S: Pt reports no pain today. O: Pt given written HEP  Time 0848- 0930      Visit   4 Repeat outcome measure at mid point and end.     Pain 0 /10     ROM AROM: 90° Forward elevation,  95° abduction, 80° ER,  IR to 65 (tested at 30* abd )   PROM: 155° Forward elevation, 120° abduction,  70° ER,  85° IR (tested at 90* abd      updated 2020       Modalities            Manual      PROM 10 min  Man          Stretch      Table slides  HEP, flex, scaption, abd  TE   Wall Flexion      Wall ER stretch      Towel IR stretch      pec stretch       Posterior capsule stretch       IR reaching behind back      Exercise      Shrugs AROM      Pendulum Ex      UBE      Pulleys - flex, scaption , abd  2 x 10 x 10s holds  TE   Pulleys-IR      Supine wand chest press 2 x 10   TE   Supine wand flex 2 x 10 x 10 s hold HEP TE   Supine wand ER/IR   TE   Supine flexion 2 x 10      S-lying ABD 2 x 10 straight arm          S-lying ER 2 x 10 1#    Sidelying AA shoulder flexion       IR at 30* abd       Prone full can 2 x 10      Prone row 2 x 10  2#    Prone Hor abd at 90*      Standing wand flexion   To approximately 95* ; HEP     Shoulder iso's  2 x 10 x 5s holds  Added to HEP    Wall crawls (flexion )   With eccentric lower  TE   Serratus punch supine  2 x 10      Standing flexion  To approx 90*    Standing wand ABD 2 x 10     Bicep curls  3#, 2

## 2020-09-23 ENCOUNTER — TREATMENT (OUTPATIENT)
Dept: PHYSICAL THERAPY | Age: 70
End: 2020-09-23
Payer: MEDICARE

## 2020-09-23 PROCEDURE — 97530 THERAPEUTIC ACTIVITIES: CPT | Performed by: PHYSICAL THERAPIST

## 2020-09-23 PROCEDURE — 97110 THERAPEUTIC EXERCISES: CPT | Performed by: PHYSICAL THERAPIST

## 2020-09-23 NOTE — PROGRESS NOTES
approximately 95* ; HEP     Shoulder iso's  2 x 10 x 5s holds  Added to HEP    Wall crawls (flexion )  1 x 10   With eccentric lower  TE   Serratus punch supine  2 x 10      Standing flexion  To approx 90*    Standing wand ABD 2 x 10     Bicep curls  2 x 10 3#, 3 positions, HEP     ROWS: H Functional activities      To aid in ROM and strength needed for reaching , lifting ,pushing and pulling at home/work    ROWS: M 3 x 10  YTB \" TA   ROWS: L \"    ER 3 x 10YTB\" TA   IR 3 x 10  YTB \" TA               A:  Tolerated well. Alphabet on plinth added to work on stability exercise for shoulder. P: Continue with POC.    Jeaneth Walker, PT DPT, [address] [facility] [id number]    Treatment Charges: Mins Units   Initial Evaluation     Re-Evaluation     Ther Exercise         TE 28  2   Manual Therapy     MT     Ther Activities        TA 10 1   Gait Training          GT     Neuro Re-education NR     Modalities     Non-Billable Service Time     Other     Total Time/Units 38  3

## 2020-09-28 ENCOUNTER — TREATMENT (OUTPATIENT)
Dept: PHYSICAL THERAPY | Age: 70
End: 2020-09-28
Payer: MEDICARE

## 2020-09-28 PROCEDURE — 97164 PT RE-EVAL EST PLAN CARE: CPT | Performed by: PHYSICAL THERAPIST

## 2020-09-28 PROCEDURE — 97110 THERAPEUTIC EXERCISES: CPT | Performed by: PHYSICAL THERAPIST

## 2020-09-28 NOTE — PROGRESS NOTES
[facility] [facility]   Phone: [phone]   Fax: [phone]        Referring Provider: Zane Soliman MD  [address] [facility]     Medical Diagnosis:   R29.898 (ICD-10-CM) - Shoulder weakness   M25.511 (ICD-10-CM) - Acute pain of right shoulder   M75.121 (ICD-10-CM) - Complete tear of right rotator cuff, unspecified whether traumatic         CERTIFICATION PERIOD:  8/17/2020 thru 10/2/2020    ATTENDANCE:  Patient has attended 6 of 6 scheduled treatments from 8/17/2020  to 9/28/2020. TREATMENTS RECEIVED:  Manual therapy, therapeutic exercise, therapeutic activity     INITIAL STATUS:  Observations: well nourished female                                                       Palpation:  Non-tender to palpation       Joint/Motion:  Right Shoulder:  AROM: 50° Forward elevation,  55° abduction, 70° ER,  IR to 50 (tested in neutral)   PROM: 90° Forward elevation, 90° abduction,  70° ER,  55° IR     Left Shoulder:  AROM: 165° Forward elevation, 170° abduction,  90° ER,  IR to 80  PROM: 180° Forward elevation, 180° abduction,  90° ER , 80° IR     Strength:  Right Shoulder: Flexion 2/5,  Abduction 2/5, ER 2/5, IR 2/5   - not formally assessed   Right Elbow:Flexion 4/5,  Extension 4/5     Left Shoulder: Flexion 4/5,  Abduction 4/5, ER 4/5, IR 4/5   Left Elbow:Flexion 5/5,  Extension 5/5        Special Tests/Functional Screens:    []? ? Parry-Gavin []? ?+ / []? ? -  []?? Morehouse's []? ?+ / []? ? -   []?? AC Sheer []? ?+ / []? ? -    []? ?[facility] drawer []? ?+ / []? ? -    []? ? Bicep Load []? ?+ / []? ? -   []? ? Crank []? ?+ / []? ? -  []? ?Silvia Schreiber []? ?+ / []? ? -  []? ?Lo Petsaurav []? ?+ / []? ? -   []? ? Brown []? ?+ / []? ? -  []? ? Neer's []? ?+ / []? ? -      []? ? Speed's []? ?+ / []? ? -   []? ? Kye's []? ?+ / []? ? -    []? ? Sulcus Sign []? ?+ / []? ? -   []? ? Apprehension []? ?+ / []? ? -   []? ? Bicep Load II []??+ / []? ? -   []? ? Elbow Valgus []? ?+ / []? ? -     []? ? Elbow Varus []? ?+ / []? ? -   []?? Carter's relocation []? ?+ / []? ? -   [x]? ? Empty Can [x]??+ / []? ? -  [x]? ? Drop arm [x]??+ / []? ? -  []?? ER lag []? ?+ / []? ? -  []? ? Painful Arc []? ?+ / []? ? -  []? ?Mary Cynthia []? ?+ / []? ? -  [x]? ? Belly Press/ lift off[x]? ?+ / []? ? -  []? ? Other: []??+ / []? ? -                Special Tests:            Per chart,  MRI shows large tear involving supraspinatus and infraspinatus with retraction to nearly the glenoid.  There is grade 3 atrophy of the supraspinatus as well as fatty atrophy of the infraspinatus.  Subscapularis appears to be intact.  Biceps appears somewhat abnormal as it leaves the groove.          CURRENT STATUS:  Observations: well nourished female                                                       Palpation:  Non-tender to palpation       Joint/Motion:  Right Shoulder:  AROM: 130° Forward elevation,  105° abduction, 70° ER,  IR to 70   PROM: 155° Forward elevation, 130° abduction,  70° ER,  75° IR      Left Shoulder:  AROM: 165° Forward elevation, 175° abduction,  90° ER,  IR to 80  PROM: 180° Forward elevation, 180° abduction,  90° ER , 90° IR     Strength:  Right Shoulder: Flexion 3/5,  Abduction 3/5, ER 4-/5, IR 4+/5     Right Elbow:Flexion 4+/5,  Extension 4+/5     Left Shoulder: Flexion 5/5,  Abduction 5/5, ER 5/5, IR 5/5   Left Elbow:Flexion 5/5,  Extension 5/5        Special Tests/Functional Screens:    []? ? Parry-Gavin []? ?+ / []? ? -  []?? Blachly's []? ?+ / []? ? -   []?? AC Sheer []? ?+ / []? ? -    []? ?[facility] drawer []? ?+ / []? ? -    []? ? Bicep Load []? ?+ / []? ? -   []? ? Crank []? ?+ / []? ? -  []? ?Dominic Morales []? ?+ / []? ? -  []? ?Louvenia Phy []? ?+ / []? ? -   []? ? Brown []? ?+ / []? ? -  []? ? Neer's []? ?+ / []? ? -      []? ? Speed's []? ?+ / []? ? -   []? ? Yerzoniason's []? ?+ / []? ? -    []? ? Sulcus Sign []? ?+ / []? ? -   []? ? Apprehension []? ?+ / []? ? -   []? ? Bicep Load II []??+ / []? ? -   []? ? Elbow Valgus []? ?+ / []? ? -     []? ? Elbow Varus []? ?+ / []? ? -   []?? Carter's relocation []? ?+ / []? ? -   [x]? ? Empty Can [x]??+ / []? ? -  []? ? Drop arm []? ?+ / [x]? ? -  []?? ER lag []? ?+ / []? ? -  []? ? Painful Arc []? ?+ / []? ? -  []? ?Mishel Monet []? ?+ / []? ? -  []? ? Belly Press/ lift off[]? ?+ / [x]? ? -  []? ? Other: []??+ / []? ? -                Special Tests:            Per chart,  MRI shows large tear involving supraspinatus and infraspinatus with retraction to nearly the glenoid.  There is grade 3 atrophy of the supraspinatus as well as fatty atrophy of the infraspinatus.  Subscapularis appears to be intact.  Biceps appears somewhat abnormal as it leaves the groove.     Short Term goals (3 weeks)  · Decrease reported pain to 0-3/10 (goal met)   · Increase ROM to AROM: 90° Forward elevation,  90° abduction, 70° ER,  IR to 50 (goal met)   · Increase Strength to 3+/5 (not met)   · Able to perform/complete the following functions/tasks: pt able to dress herself with minor pain/limitation. Pt able to forward elevate to 90* 10x with minor pain/limitation.  Pt able to reach behind back to belt line with minor pain/limitation.   (goal met)   · QuickDASH (Disorders of the Arm, Shoulder, and Hand) 30% disability (goal met)     Long Term goals (6 weeks)  · Decrease reported pain to 0-2/10 (goal met)   · Increase ROM to AROM: 150° Forward elevation,  150° abduction, 80° ER,  IR to 70 (not met)   · Increase Strength to 4-/5 (not met)  · Able to perform/complete the following functions/tasks: pt able to reach [place] 10x with minor pain/limitation,  Pt able to reach lower lumbar with minor pain/limitation. Pt able to dress herself with no pain/limitation. (partial goal met)  · QuickDASH 20% disability (goal met)   · Independent with Home Exercise Programs               OUTCOME MEASURE:  Quickdash 4.55%    COMMENTS AND RECOMMENDATIONS:   Pt demonstrates significant gains in ROM and functional mobility. Pt reports happy with gains made.   Pt reports no longer having difficulty with daily activities such as dressing, bathing, etc.  Pt given handout for HEP to continue at home. Pt continues to struggle with strength gains; limited by original injury. Pt instructed to call with any questions. Thank you for the opportunity to work with your patient. Erick Mejía, PT DPT [id number]    I CERTIFY THAT THE ABOVE REASSESSMENT AND PLAN OF CARE FOR PHYSICAL THERAPY SERVICES ARE APPROPRIATE AND MEDICALLY NECESSARY.       ________________________                _______________  Physician     Date

## 2020-09-28 NOTE — PROGRESS NOTES
[address] [facility]   Phone: [phone]   Fax: [phone]       Physical Therapy Daily Treatment Note    Date: 2020   Patient Name: Zach Maradiaga  :    MRN: [id number]  DOInjury: 2020  DOSx: NA   Referring Provider: Glynn Casillas MD  [facility]     Medical Diagnosis:   Diagnosis   R29.898 (ICD-10-CM) - Shoulder weakness   M25.511 (ICD-10-CM) - Acute pain of right shoulder   M75.121 (ICD-10-CM) - Complete tear of right rotator cuff, unspecified whether traumatic         Outcome Measure:  Maura Paniagua 4.55%    S: Pt reports no pain. O: Pt given written HEP  Time 5981- [id number]     Visit   5 Repeat outcome measure at mid point and end.     Pain 0 /10     ROM   Right Shoulder:  AROM: 130° Forward elevation,  105° abduction, 70° ER,  IR to 70   IWDM: 347° Forward elevation, 130° abduction,  70° ER,  75° IR      Modalities            Manual      PROM   Man          Stretch      Table slides  HEP, flex, scaption, abd  TE   Wall Flexion      Wall ER stretch      Towel IR stretch      pec stretch       Posterior capsule stretch       IR reaching behind back      Exercise      Shrugs AROM      Alphabet on plinth table      UBE     Pulleys - flex, scaption , abd  2 x 10 x 10s holds  TE   Pulleys-IR      Supine wand chest press  TE   Supine wand flex HEP TE   Supine wand ER/IR  TE   Supine flexion     S-lying ABD  straight arm          S-lying ER 1#    Sidelying AA shoulder flexion      IR at 30* abd      Prone full can     Prone row 2#    Prone Hor abd at 90*     Standing wand flexion  To approximately 95* ; HEP     Shoulder iso's  Added to HEP    Wall crawls (flexion )  With eccentric lower  TE   Serratus punch supine      Standing flexion To approx 90*    Standing wand ABD     Bicep curls  3#, 3 positions, HEP     ROWS: H  To aid in ROM and strength needed for reaching , lifting ,pushing and pulling at home/work    ROWS: M YTB \" TA   ROWS: L \"

## 2020-10-20 ENCOUNTER — HOSPITAL ENCOUNTER (OUTPATIENT)
Age: 70
Discharge: HOME | End: 2020-10-22
Payer: MEDICARE

## 2020-10-20 LAB
ALBUMIN SERPL-MCNC: 4.5 G/DL (ref 3.5–5.2)
ALP BLD-CCNC: 73 U/L (ref 35–104)
ALT SERPL-CCNC: 32 U/L (ref 0–32)
ANION GAP SERPL CALCULATED.3IONS-SCNC: 21 MMOL/L (ref 7–16)
AST SERPL-CCNC: 27 U/L (ref 0–31)
BASOPHILS ABSOLUTE: 0.03 E9/L (ref 0–0.2)
BASOPHILS RELATIVE PERCENT: 0.6 % (ref 0–2)
BILIRUB SERPL-MCNC: 0.4 MG/DL (ref 0–1.2)
BUN BLDV-MCNC: 19 MG/DL (ref 8–23)
CALCIUM SERPL-MCNC: 9.6 MG/DL (ref 8.6–10.2)
CHLORIDE BLD-SCNC: 103 MMOL/L (ref 98–107)
CHOLESTEROL, TOTAL: 144 MG/DL (ref 0–199)
CO2: 18 MMOL/L (ref 22–29)
CREAT SERPL-MCNC: 1 MG/DL (ref 0.5–1)
EOSINOPHILS ABSOLUTE: 0.14 E9/L (ref 0.05–0.5)
EOSINOPHILS RELATIVE PERCENT: 2.9 % (ref 0–6)
FOLATE: >20 NG/ML (ref 4.8–24.2)
GFR AFRICAN AMERICAN: >60
GFR NON-AFRICAN AMERICAN: 55 ML/MIN/1.73
GLUCOSE BLD-MCNC: 194 MG/DL (ref 74–99)
HBA1C MFR BLD: 7.6 % (ref 4–5.6)
HCT VFR BLD CALC: 36.2 % (ref 34–48)
HDLC SERPL-MCNC: 46 MG/DL
HEMOGLOBIN: 11.8 G/DL (ref 11.5–15.5)
IMMATURE GRANULOCYTES #: 0.02 E9/L
IMMATURE GRANULOCYTES %: 0.4 % (ref 0–5)
LDL CHOLESTEROL CALCULATED: 63 MG/DL (ref 0–99)
LYMPHOCYTES ABSOLUTE: 1.76 E9/L (ref 1.5–4)
LYMPHOCYTES RELATIVE PERCENT: 36.8 % (ref 20–42)
MCH RBC QN AUTO: 32 PG (ref 26–35)
MCHC RBC AUTO-ENTMCNC: 32.6 % (ref 32–34.5)
MCV RBC AUTO: 98.1 FL (ref 80–99.9)
MICROALBUMIN UR-MCNC: 61.4 MG/L
MONOCYTES ABSOLUTE: 0.45 E9/L (ref 0.1–0.95)
MONOCYTES RELATIVE PERCENT: 9.4 % (ref 2–12)
NEUTROPHILS ABSOLUTE: 2.38 E9/L (ref 1.8–7.3)
NEUTROPHILS RELATIVE PERCENT: 49.9 % (ref 43–80)
PDW BLD-RTO: 12.4 FL (ref 11.5–15)
PLATELET # BLD: 217 E9/L (ref 130–450)
PMV BLD AUTO: 12.1 FL (ref 7–12)
POTASSIUM SERPL-SCNC: 4.6 MMOL/L (ref 3.5–5)
RBC # BLD: 3.69 E12/L (ref 3.5–5.5)
SODIUM BLD-SCNC: 142 MMOL/L (ref 132–146)
T4 FREE: 1.46 NG/DL (ref 0.93–1.7)
TOTAL PROTEIN: 7.5 G/DL (ref 6.4–8.3)
TRIGL SERPL-MCNC: 177 MG/DL (ref 0–149)
TSH SERPL DL<=0.05 MIU/L-ACNC: 2.13 UIU/ML (ref 0.27–4.2)
VITAMIN B-12: 412 PG/ML (ref 211–946)
VLDLC SERPL CALC-MCNC: 35 MG/DL
WBC # BLD: 4.8 E9/L (ref 4.5–11.5)

## 2020-10-20 PROCEDURE — 84439 ASSAY OF FREE THYROXINE: CPT

## 2020-10-20 PROCEDURE — 85025 COMPLETE CBC W/AUTO DIFF WBC: CPT

## 2020-10-20 PROCEDURE — 82746 ASSAY OF FOLIC ACID SERUM: CPT

## 2020-10-20 PROCEDURE — 83036 HEMOGLOBIN GLYCOSYLATED A1C: CPT

## 2020-10-20 PROCEDURE — 80053 COMPREHEN METABOLIC PANEL: CPT

## 2020-10-20 PROCEDURE — 82607 VITAMIN B-12: CPT

## 2020-10-20 PROCEDURE — 80061 LIPID PANEL: CPT

## 2020-10-20 PROCEDURE — 36415 COLL VENOUS BLD VENIPUNCTURE: CPT

## 2020-10-20 PROCEDURE — 84443 ASSAY THYROID STIM HORMONE: CPT

## 2020-10-20 PROCEDURE — 82044 UR ALBUMIN SEMIQUANTITATIVE: CPT

## 2020-10-27 ENCOUNTER — OFFICE VISIT (OUTPATIENT)
Dept: FAMILY MEDICINE CLINIC | Age: 70
End: 2020-10-27
Payer: MEDICARE

## 2020-10-27 VITALS
DIASTOLIC BLOOD PRESSURE: 88 MMHG | SYSTOLIC BLOOD PRESSURE: 138 MMHG | HEART RATE: 74 BPM | TEMPERATURE: 97.9 F | OXYGEN SATURATION: 99 % | WEIGHT: 215.61 LBS | HEIGHT: 67 IN | BODY MASS INDEX: 33.84 KG/M2

## 2020-10-27 VITALS
BODY MASS INDEX: 33.84 KG/M2 | WEIGHT: 215.6 LBS | TEMPERATURE: 97.8 F | SYSTOLIC BLOOD PRESSURE: 138 MMHG | OXYGEN SATURATION: 99 % | DIASTOLIC BLOOD PRESSURE: 88 MMHG | HEIGHT: 67 IN | HEART RATE: 74 BPM

## 2020-10-27 PROCEDURE — 90732 PPSV23 VACC 2 YRS+ SUBQ/IM: CPT | Performed by: FAMILY MEDICINE

## 2020-10-27 PROCEDURE — 99214 OFFICE O/P EST MOD 30 MIN: CPT | Performed by: FAMILY MEDICINE

## 2020-10-27 PROCEDURE — 3051F HG A1C>EQUAL 7.0%<8.0%: CPT | Performed by: FAMILY MEDICINE

## 2020-10-27 PROCEDURE — G0439 PPPS, SUBSEQ VISIT: HCPCS | Performed by: FAMILY MEDICINE

## 2020-10-27 PROCEDURE — G0009 ADMIN PNEUMOCOCCAL VACCINE: HCPCS | Performed by: FAMILY MEDICINE

## 2020-10-27 RX ORDER — SIMVASTATIN 20 MG
TABLET ORAL
Qty: 90 TABLET | Refills: 1 | Status: SHIPPED
Start: 2020-10-27 | End: 2021-04-20 | Stop reason: SDUPTHER

## 2020-10-27 RX ORDER — METFORMIN HYDROCHLORIDE 500 MG/1
500 TABLET, EXTENDED RELEASE ORAL 2 TIMES DAILY WITH MEALS
Qty: 180 TABLET | Refills: 1 | Status: SHIPPED
Start: 2020-10-27 | End: 2021-04-20 | Stop reason: SDUPTHER

## 2020-10-27 RX ORDER — LISINOPRIL 10 MG/1
10 TABLET ORAL DAILY
Qty: 90 TABLET | Refills: 1 | Status: SHIPPED
Start: 2020-10-27 | End: 2021-04-20 | Stop reason: SDUPTHER

## 2020-10-27 RX ORDER — LEVOTHYROXINE SODIUM 112 UG/1
112 TABLET ORAL DAILY
Qty: 90 TABLET | Refills: 1 | Status: SHIPPED
Start: 2020-10-27 | End: 2021-04-20 | Stop reason: SDUPTHER

## 2020-10-27 ASSESSMENT — PATIENT HEALTH QUESTIONNAIRE - PHQ9
SUM OF ALL RESPONSES TO PHQ QUESTIONS 1-9: 0
1. LITTLE INTEREST OR PLEASURE IN DOING THINGS: 0
SUM OF ALL RESPONSES TO PHQ QUESTIONS 1-9: 0
SUM OF ALL RESPONSES TO PHQ QUESTIONS 1-9: 0
2. FEELING DOWN, DEPRESSED OR HOPELESS: 0
SUM OF ALL RESPONSES TO PHQ9 QUESTIONS 1 & 2: 0

## 2020-10-27 ASSESSMENT — LIFESTYLE VARIABLES
HOW OFTEN DURING THE LAST YEAR HAVE YOU FAILED TO DO WHAT WAS NORMALLY EXPECTED FROM YOU BECAUSE OF DRINKING: NEVER
HOW OFTEN DURING THE LAST YEAR HAVE YOU FOUND THAT YOU WERE NOT ABLE TO STOP DRINKING ONCE YOU HAD STARTED: 0
HOW OFTEN DURING THE LAST YEAR HAVE YOU FOUND THAT YOU WERE NOT ABLE TO STOP DRINKING ONCE YOU HAD STARTED: NEVER
HOW OFTEN DO YOU HAVE A DRINK CONTAINING ALCOHOL: 1
HOW OFTEN DO YOU HAVE SIX OR MORE DRINKS ON ONE OCCASION: 0
AUDIT-C TOTAL SCORE: 1
HAS A RELATIVE, FRIEND, DOCTOR, OR ANOTHER HEALTH PROFESSIONAL EXPRESSED CONCERN ABOUT YOUR DRINKING OR SUGGESTED YOU CUT DOWN: NO
HOW OFTEN DO YOU HAVE A DRINK CONTAINING ALCOHOL: MONTHLY OR LESS
AUDIT TOTAL SCORE: 0
HAVE YOU OR SOMEONE ELSE BEEN INJURED AS A RESULT OF YOUR DRINKING: 0
HOW OFTEN DURING THE LAST YEAR HAVE YOU FAILED TO DO WHAT WAS NORMALLY EXPECTED FROM YOU BECAUSE OF DRINKING: 0
HOW OFTEN DURING THE LAST YEAR HAVE YOU NEEDED AN ALCOHOLIC DRINK FIRST THING IN THE MORNING TO GET YOURSELF GOING AFTER A NIGHT OF HEAVY DRINKING: 0
HOW OFTEN DURING THE LAST YEAR HAVE YOU BEEN UNABLE TO REMEMBER WHAT HAPPENED THE NIGHT BEFORE BECAUSE YOU HAD BEEN DRINKING: 0
HOW OFTEN DO YOU HAVE SIX OR MORE DRINKS ON ONE OCCASION: NEVER
HOW OFTEN DURING THE LAST YEAR HAVE YOU BEEN UNABLE TO REMEMBER WHAT HAPPENED THE NIGHT BEFORE BECAUSE YOU HAD BEEN DRINKING: NEVER
HOW OFTEN DURING THE LAST YEAR HAVE YOU HAD A FEELING OF GUILT OR REMORSE AFTER DRINKING: 0
HOW MANY STANDARD DRINKS CONTAINING ALCOHOL DO YOU HAVE ON A TYPICAL DAY: 0
AUDIT TOTAL SCORE: 1
HAVE YOU OR SOMEONE ELSE BEEN INJURED AS A RESULT OF YOUR DRINKING: NO
HAS A RELATIVE, FRIEND, DOCTOR, OR ANOTHER HEALTH PROFESSIONAL EXPRESSED CONCERN ABOUT YOUR DRINKING OR SUGGESTED YOU CUT DOWN: 0
HOW MANY STANDARD DRINKS CONTAINING ALCOHOL DO YOU HAVE ON A TYPICAL DAY: ONE OR TWO
HOW OFTEN DURING THE LAST YEAR HAVE YOU NEEDED AN ALCOHOLIC DRINK FIRST THING IN THE MORNING TO GET YOURSELF GOING AFTER A NIGHT OF HEAVY DRINKING: NEVER
AUDIT-C TOTAL SCORE: 0
HOW OFTEN DURING THE LAST YEAR HAVE YOU HAD A FEELING OF GUILT OR REMORSE AFTER DRINKING: NEVER

## 2020-10-27 ASSESSMENT — ENCOUNTER SYMPTOMS
DIARRHEA: 0
NAUSEA: 0
TROUBLE SWALLOWING: 0
WHEEZING: 0
COUGH: 0
SINUS PAIN: 0
EYE PAIN: 0
SHORTNESS OF BREATH: 0
SORE THROAT: 0
CONSTIPATION: 0
BACK PAIN: 0
ABDOMINAL PAIN: 0
CHEST TIGHTNESS: 0
VOMITING: 0

## 2020-10-27 NOTE — PROGRESS NOTES
10/27/20    Name: Fatmata Light  :1950   Sex:female   Age:70 y.o. Chief Complaint   Patient presents with    Diabetes    Hyperlipidemia    Hypothyroidism     Patient presents to office for visit. She did have labs done. Patient is taking her medications as prescribed without side effects. She asks if her dose of Vitamin D is too high at 5,000 units. Patient has completed physical therapy on her right arm, she had a fall in February and was unable to lift her right arm. Patient now has increase range of motion and strength in her right arm, she was given exercises by physical therapist to do at home. Patient will need refills on her medications. Here for check up    Labs reviwed    Diabetes a1c is increased to 7.8%  She admits she has been eating a lot of junk lately  We discussed concequences of this  Elevated blood sugars   Elevated blood pressures  Will start her on lisinopril  GFR slightly decreased this visit as well and microalbumin positive  Continue metformin at this time and simvastatin  In 6 months if not changes then we will dicuss sglt2 inhibitor due to her gfr, increase a1c and increasted statin with increased risk ascvd    Thyroid stable  Continue her dcurrent dose        Review of Systems   Constitutional: Negative for appetite change, fatigue and fever. HENT: Negative for congestion, ear pain, sinus pain, sore throat and trouble swallowing. Eyes: Negative for pain. Respiratory: Negative for cough, chest tightness, shortness of breath and wheezing. Cardiovascular: Negative for chest pain, palpitations and leg swelling. Gastrointestinal: Negative for abdominal pain, constipation, diarrhea, nausea and vomiting. Endocrine: Negative for cold intolerance and heat intolerance. Genitourinary: Negative for difficulty urinating, dysuria, frequency, hematuria, pelvic pain and urgency.    Musculoskeletal: Negative for arthralgias, back pain, gait problem, joint swelling and myalgias. Skin: Negative for rash and wound. Neurological: Negative for dizziness, syncope, light-headedness and headaches. Hematological: Negative for adenopathy. Psychiatric/Behavioral: Negative for confusion, self-injury, sleep disturbance and suicidal ideas. The patient is not nervous/anxious.             Current Outpatient Medications:     levothyroxine (SYNTHROID) 112 MCG tablet, Take 1 tablet by mouth daily, Disp: 90 tablet, Rfl: 1    metFORMIN (GLUCOPHAGE-XR) 500 MG extended release tablet, Take 1 tablet by mouth 2 times daily (with meals), Disp: 180 tablet, Rfl: 1    simvastatin (ZOCOR) 20 MG tablet, take 1 tablet by mouth at bedtime, Disp: 90 tablet, Rfl: 1    lisinopril (PRINIVIL;ZESTRIL) 10 MG tablet, Take 1 tablet by mouth daily, Disp: 90 tablet, Rfl: 1    Cholecalciferol (VITAMIN D3) 5000 units TABS, Take 5,000 Units by mouth daily, Disp: , Rfl:     Ped Multivitamins-Fl-Iron (MULTI-VIT/FLUORIDE/IRON PO), Take by mouth daily, Disp: , Rfl:     vitamin C (ASCORBIC ACID) 500 MG tablet, Take 500 mg by mouth daily, Disp: , Rfl:   No Known Allergies   Past Medical History:   Diagnosis Date    Glaucoma     Headache     migraine    Hyperlipidemia     Hypothyroidism      Patient Active Problem List    Diagnosis Date Noted    Diabetes mellitus due to underlying condition, uncontrolled, with hyperglycemia ([facility] [address] 11/22/2019    Hypothyroidism 05/13/2019    Hyperlipidemia 05/13/2019    Seasonal allergies 05/13/2019    Glaucoma 05/13/2019      Past Surgical History:   Procedure Laterality Date    OVARIAN CYST REMOVAL  1976    TONSILLECTOMY AND ADENOIDECTOMY      TRABECULECTOMY Right 2019      Social History     Tobacco History     Smoking Status  Never Smoker    Smokeless Tobacco Use  Never Used          Alcohol History     Alcohol Use Status  Not Asked Comment  Occasional          Drug Use     Drug Use Status  Not Asked          Sexual Activity     Sexually Active  Not Asked            BP 138/88   Pulse 74   Temp 97.8 °F (36.6 °C)   Ht 5' 7\" (1.702 m)   Wt 215 lb 9.6 oz (97.8 kg)   SpO2 99%   BMI 33.77 kg/m²     EXAM:   Physical Exam  Vitals signs and nursing note reviewed. Constitutional:       Appearance: Normal appearance. She is well-developed. HENT:      Head: Normocephalic and atraumatic. Right Ear: Tympanic membrane normal.      Left Ear: Tympanic membrane normal.      Nose: Nose normal.      Mouth/Throat:      Mouth: Mucous membranes are moist.   Eyes:      Pupils: Pupils are equal, round, and reactive to light. Neck:      Musculoskeletal: Normal range of motion. Cardiovascular:      Rate and Rhythm: Normal rate and regular rhythm. Pulmonary:      Effort: Pulmonary effort is normal.      Breath sounds: Normal breath sounds. Abdominal:      General: Bowel sounds are normal.      Palpations: Abdomen is soft. Musculoskeletal:      Comments: Gait normal in the office today   Skin:     General: Skin is warm and dry. Neurological:      General: No focal deficit present. Mental Status: She is alert and oriented to person, place, and time. Psychiatric:         Mood and Affect: Mood normal.         Thought Content: Thought content normal.          Yaritza Kingston was seen today for diabetes, hyperlipidemia and hypothyroidism. Diagnoses and all orders for this visit:    Essential hypertension  Comments:  add lisinopril 10mg daily    Acquired hypothyroidism  Comments:  continue dose @ 112mcg  labs reviewed and stable  Orders:  -     levothyroxine (SYNTHROID) 112 MCG tablet; Take 1 tablet by mouth daily  -     T4, Free; Future  -     TSH without Reflex; Future    Diabetes mellitus due to underlying condition, uncontrolled, with hyperglycemia ([facility] [address]  Comments:  continue metformin XR 2 pills daily   much better a1c now 7.8%  continue diet and checking sugars  Orders:  -     metFORMIN (GLUCOPHAGE-XR) 500 MG extended release tablet;  Take 1 tablet by mouth 2 times daily (with meals)  -     CBC Auto Differential; Future  -     Comprehensive Metabolic Panel; Future  -     Hemoglobin A1C; Future    Mixed hyperlipidemia  Comments:  stable with medications  Orders:  -     simvastatin (ZOCOR) 20 MG tablet; take 1 tablet by mouth at bedtime  -     Lipid Panel; Future    Other orders  -     lisinopril (PRINIVIL;ZESTRIL) 10 MG tablet; Take 1 tablet by mouth daily  -     Pneumococcal polysaccharide vaccine 23-valent greater than or equal to 3yo subcutaneous/IM    appt in 6 months with labs prior to that appt    I independently reviewed and updated the chief complaint, HPI, past medical and surgical history, medications, allergies and ROS as entered by the LPN. Seen by:   Keon King DO

## 2020-10-28 NOTE — PATIENT INSTRUCTIONS
Personalized Preventive Plan for Mary Weller - 10/27/2020  Medicare offers a range of preventive health benefits. Some of the tests and screenings are paid in full while other may be subject to a deductible, co-insurance, and/or copay. Some of these benefits include a comprehensive review of your medical history including lifestyle, illnesses that may run in your family, and various assessments and screenings as appropriate. After reviewing your medical record and screening and assessments performed today your provider may have ordered immunizations, labs, imaging, and/or referrals for you. A list of these orders (if applicable) as well as your Preventive Care list are included within your After Visit Summary for your review. Other Preventive Recommendations:    · A preventive eye exam performed by an eye specialist is recommended every 1-2 years to screen for glaucoma; cataracts, macular degeneration, and other eye disorders. · A preventive dental visit is recommended every 6 months. · Try to get at least 150 minutes of exercise per week or 10,000 steps per day on a pedometer . · Order or download the FREE \"Exercise & Physical Activity: Your Everyday Guide\" from [employer]. Call 3-226.769.1629 or search [employer] online. · You need 8637-8347 mg of calcium and 9035-2159 IU of vitamin D per day. It is possible to meet your calcium requirement with diet alone, but a vitamin D supplement is usually necessary to meet this goal.  · When exposed to the sun, use a sunscreen that protects against both UVA and UVB radiation with an SPF of 30 or greater. Reapply every 2 to 3 hours or after sweating, drying off with a towel, or swimming. · Always wear a seat belt when traveling in a car. Always wear a helmet when riding a bicycle or motorcycle.

## 2020-10-28 NOTE — PROGRESS NOTES
Medicare Annual Wellness Visit  Name: Pio Mcdermott Date: 10/27/2020   MRN: [id number] Sex: Female   Age: 79 y.o. Ethnicity: Non-/Non    : 1950 Race: Jace Lopez is here for Medicare AWV    Screenings for behavioral, psychosocial and functional/safety risks, and cognitive dysfunction are all negative except as indicated below. These results, as well as other patient data from the [address] form, are documented in Flowsheets linked to this Encounter. No Known Allergies      Prior to Visit Medications    Medication Sig Taking? Authorizing Provider   levothyroxine (SYNTHROID) 112 MCG tablet Take 1 tablet by mouth daily  Aspen Singh, DO   metFORMIN (GLUCOPHAGE-XR) 500 MG extended release tablet Take 1 tablet by mouth 2 times daily (with meals)  Aspen Singh, DO   simvastatin (ZOCOR) 20 MG tablet take 1 tablet by mouth at bedtime  Aspen Singh, DO   lisinopril (PRINIVIL;ZESTRIL) 10 MG tablet Take 1 tablet by mouth daily  Aspen Singh,    Cholecalciferol (VITAMIN D3) 5000 units TABS Take 5,000 Units by mouth daily  Historical Provider, MD   Ped Multivitamins-Fl-Iron (MULTI-VIT/FLUORIDE/IRON PO) Take by mouth daily  Historical Provider, MD   vitamin C (ASCORBIC ACID) 500 MG tablet Take 500 mg by mouth daily  Historical Provider, MD         Past Medical History:   Diagnosis Date    Glaucoma     Headache     migraine    Hyperlipidemia     Hypothyroidism        Past Surgical History:   Procedure Laterality Date    OVARIAN CYST REMOVAL      TONSILLECTOMY AND ADENOIDECTOMY      TRABECULECTOMY Right 2019       No family history on file.     CareTeam (Including outside providers/suppliers regularly involved in providing care):   Patient Care Team:  Aspen Singh DO as PCP - General (Family Medicine)  Aspen Singh DO as PCP - Alexander Esteban Provider    Wt Readings from Last 3 Encounters:   10/27/20 215 lb 9.8 oz (97.8 kg)   10/27/20 215 lb 9.6 oz (97.8 kg)   08/11/20 210 lb (95.3 kg)     Vitals:    10/27/20 1217   BP: 138/88   Pulse: 74   Temp: 97.9 °F (36.6 °C)   SpO2: 99%   Weight: 215 lb 9.8 oz (97.8 kg)   Height: 5' 7\" (1.702 m)     Body mass index is 33.77 kg/m². Based upon direct observation of the patient, evaluation of cognition reveals recent and remote memory intact. General Appearance: alert and oriented to person, place and time, well developed and well- nourished, in no acute distress  Skin: warm and dry, no rash or erythema  Head: normocephalic and atraumatic  Eyes: pupils equal, round, and reactive to light, extraocular eye movements intact, conjunctivae normal  ENT: tympanic membrane, external ear and ear canal normal bilaterally, nose without deformity, nasal mucosa and turbinates normal without polyps  Neck: supple and non-tender without mass, no thyromegaly or thyroid nodules, no cervical lymphadenopathy  Pulmonary/Chest: clear to auscultation bilaterally- no wheezes, rales or rhonchi, normal air movement, no respiratory distress  Cardiovascular: normal rate, regular rhythm, normal S1 and S2, no murmurs, rubs, clicks, or gallops, distal pulses intact, no carotid bruits  Abdomen: soft, non-tender, non-distended, normal bowel sounds, no masses or organomegaly  Extremities: no cyanosis, clubbing or edema  Musculoskeletal: normal range of motion, no joint swelling, deformity or tenderness  Neurologic: reflexes normal and symmetric, no cranial nerve deficit, gait, coordination and speech normal    Patient's complete Health Risk Assessment and screening values have been reviewed and are found in Flowsheets. The following problems were reviewed today and where indicated follow up appointments were made and/or referrals ordered.     Positive Risk Factor Screenings with Interventions:     Fall Risk:  2 or more falls in past year?: no  Fall with injury in past year?: (!) yes  Fall Risk Interventions:    · Home safety tips provided    General Health and ACP:  General  In general, how would you say your health is?: Good  In the past 7 days, have you experienced any of the following?  New or Increased Pain, New or Increased Fatigue, Loneliness, Social Isolation, Stress or Anger?: None of These  Do you get the social and emotional support that you need?: Yes  Do you have a Living Will?: (!) No  Advance Directives     Power of [facility] Will ACP-Advance Directive ACP-Power of     Not on File Not on File [id number] [facility] Interventions:  · No Living Will: ACP documents already completed- patient asked to provide copy to the office    Health Habits/Nutrition:  Health Habits/Nutrition  Do you exercise for at least 20 minutes 2-3 times per week?: (!) No  Have you lost any weight without trying in the past 3 months?: No  Do you eat fewer than 2 meals per day?: No  Have you seen a dentist within the past year?: Yes  Body mass index: (!) 33.77  Health Habits/Nutrition Interventions:  · Inadequate physical activity:  patient agrees to wear a pedometer and walk at least 10,000 steps/day    Hearing/Vision:  No exam data present  Hearing/Vision  Do you or your family notice any trouble with your hearing?: No  Do you have difficulty driving, watching TV, or doing any of your daily activities because of your eyesight?: (!) Yes  Have you had an eye exam within the past year?: Yes  Hearing/Vision Interventions:  · Vision concerns:  patient encouraged to make appointment with his/her eye specialist    Safety:  Safety  Do you have working smoke detectors?: Yes  Have all throw rugs been removed or fastened?: (!) No  Do you have non-slip mats or surfaces in all bathtubs/showers?: Yes  Do all of your stairways have a railing or banister?: Yes  Are your doorways, halls and stairs free of clutter?: Yes  Do you always fasten your seatbelt when you are in a car?: Yes  Safety Interventions:  · Home safety tips at a health care facility    Essential hypertension  Comments:  stable, no changes    Type 2 diabetes mellitus without complication, with long-term current use of insulin (HCC)  Comments:  a1c increased, work on diet and recheck srinivas few months

## 2020-12-14 ENCOUNTER — OFFICE VISIT (OUTPATIENT)
Dept: PRIMARY CARE CLINIC | Age: 70
End: 2020-12-14
Payer: MEDICARE

## 2020-12-14 VITALS
SYSTOLIC BLOOD PRESSURE: 132 MMHG | OXYGEN SATURATION: 95 % | HEART RATE: 89 BPM | TEMPERATURE: 97.8 F | DIASTOLIC BLOOD PRESSURE: 74 MMHG

## 2020-12-14 LAB
Lab: NORMAL
QC PASS/FAIL: NORMAL
SARS-COV-2, POC: NORMAL

## 2020-12-14 PROCEDURE — 87426 SARSCOV CORONAVIRUS AG IA: CPT | Performed by: FAMILY MEDICINE

## 2020-12-14 PROCEDURE — 99213 OFFICE O/P EST LOW 20 MIN: CPT | Performed by: FAMILY MEDICINE

## 2020-12-14 NOTE — PROGRESS NOTES
Chief Complaint   Cough, Congestion, and Headache      History of Present Illness   Source of history provided by: patient. Telma Lu is a 79 y.o. old female who has a past medical history of:   Past Medical History:   Diagnosis Date    Glaucoma     Headache     migraine    Hyperlipidemia     Hypothyroidism         Presents to the flu clinic for evaluation of above symptoms x 14 days. She gathered for thanksgiving and other family members tested positive. She has been improving over the past week. Her  came down with symptoms a few days after she did and his test today is mildly positive. Denies any  loss of smell or taste, persistent headaches, any fevers, chills, sweats, CP, dyspnea, LE edema, abdominal pain, diarrhea, vomiting, rash, or lethargy. Has been taking tylenol, mucinexDM, With some symptomatic relief. YES contact with individuals with known COVID-19 infection or under investigation for COVID-19 infection. Denies any hx of asthma or COPD. no hx of tobacco use. ROS   Pertinent positives and negatives are stated within HPI, all other systems reviewed and are negative. Surgical History:  has a past surgical history that includes ovarian cyst removal (1976); Tonsillectomy and adenoidectomy; and trabeculectomy (Right, 2019). Social History:  reports that she has never smoked. She has never used smokeless tobacco.  Family History: family history is not on file. Allergies: Patient has no known allergies. Physical Exam      VS:  /74   Pulse 89   Temp 97.8 °F (36.6 °C) (Oral)   SpO2 95%    Oxygen Saturation Interpretation: Normal.    Constitutional:  Alert, development consistent with age. NAD. Head:  NC/NT. Airway patent. Ears: TMs norm bilaterally. Canals without exudate or swelling bilaterally. Nose: turbinates with mild erythema, no lesions. TTP over frontal and maxillary sinuses. Mouth: Posterior pharynx with mild erythema and clear postnasal drip. no tonsillar hypertrophy or exudate. Neck:  Normal ROM. Supple. no anterior cervical adenopathy noted. Lungs: CTAB without wheezes, rales, or rhonchi. CV:  Regular rate and rhythm, normal heart sounds, without pathological murmurs, ectopy, gallops, or rubs. Abdomen: soft, nontender, NABS x 4, no firm or pulsatile masses, no organomegaly, no rebound or guarding. Skin:  Normal turgor. Warm, dry, without visible rash. Lymphatic: No lymphangitis or adenopathy noted unless otherwise specified. Neurological:  Oriented. Motor functions intact. Lab / Imaging Results   (All laboratory and radiology results have been personally reviewed by myself)  Labs:  No results found for this visit on 12/14/20. Imaging: All Radiology results interpreted by Radiologist unless otherwise noted. No results found. Medical Decision Making   Pt non-toxic, in no apparent distress and stable at time of discharge. Assessment/Plan   Amanda Luque was seen today for cough, congestion and headache. Diagnoses and all orders for this visit:    Encounter for laboratory testing for COVID-19 virus  -     POCT COVID-19, Antigen    COVID-19     positive and she is at end of 14 days now    COVID-19 swab obtained and pending, will call with results once available. Advised cautionary self-quarantine at home in the interim. Pt should remain out of work/public for at least 71-70 days from the start of symptoms. Pt should also be fever free for 24 hours and symptoms should be improved overall prior to returning to work/public. Work excuse provided to patient today(if applicable). For any Rxs, possible side effects discussed. Increase fluids and rest.   Symptomatic relief discussed including Tylenol prn pain/fever. Schedule virtual f/u with PCP in 7-10 days if symptoms persist.   ED sooner if symptoms worsen or change. ED immediately with high or refractory fever, progressive SOB, dyspnea, CP, calf pain/swelling, shaking chills, vomiting, abdominal pain, lethargy, flank pain, or decreased urinary output. Pt verbalizes understanding and is in agreement with plan of care. All questions answered. Sheyla Jorgensen MD    This visit was provided as a focused evaluation during the COVID -19 pandemic/national emergency. A comprehensive review of all previous patient history and testing was not conducted. Pertinent findings were elicited during the visit.

## 2021-04-16 DIAGNOSIS — E03.9 ACQUIRED HYPOTHYROIDISM: ICD-10-CM

## 2021-04-16 DIAGNOSIS — E08.65 DIABETES MELLITUS DUE TO UNDERLYING CONDITION, UNCONTROLLED, WITH HYPERGLYCEMIA (HCC): ICD-10-CM

## 2021-04-16 DIAGNOSIS — E78.2 MIXED HYPERLIPIDEMIA: ICD-10-CM

## 2021-04-16 LAB
ALBUMIN SERPL-MCNC: 4.4 G/DL (ref 3.5–5.2)
ALP BLD-CCNC: 66 U/L (ref 35–104)
ALT SERPL-CCNC: 16 U/L (ref 0–32)
ANION GAP SERPL CALCULATED.3IONS-SCNC: 13 MMOL/L (ref 7–16)
AST SERPL-CCNC: 16 U/L (ref 0–31)
BASOPHILS ABSOLUTE: 0.03 E9/L (ref 0–0.2)
BASOPHILS RELATIVE PERCENT: 0.7 % (ref 0–2)
BILIRUB SERPL-MCNC: 0.5 MG/DL (ref 0–1.2)
BUN BLDV-MCNC: 24 MG/DL (ref 8–23)
CALCIUM SERPL-MCNC: 9.9 MG/DL (ref 8.6–10.2)
CHLORIDE BLD-SCNC: 105 MMOL/L (ref 98–107)
CHOLESTEROL, TOTAL: 130 MG/DL (ref 0–199)
CO2: 24 MMOL/L (ref 22–29)
CREAT SERPL-MCNC: 1 MG/DL (ref 0.5–1)
EOSINOPHILS ABSOLUTE: 0.1 E9/L (ref 0.05–0.5)
EOSINOPHILS RELATIVE PERCENT: 2.4 % (ref 0–6)
GFR AFRICAN AMERICAN: >60
GFR NON-AFRICAN AMERICAN: 55 ML/MIN/1.73
GLUCOSE BLD-MCNC: 122 MG/DL (ref 74–99)
HBA1C MFR BLD: 6.6 % (ref 4–5.6)
HCT VFR BLD CALC: 36.6 % (ref 34–48)
HDLC SERPL-MCNC: 43 MG/DL
HEMOGLOBIN: 11.9 G/DL (ref 11.5–15.5)
IMMATURE GRANULOCYTES #: 0.01 E9/L
IMMATURE GRANULOCYTES %: 0.2 % (ref 0–5)
LDL CHOLESTEROL CALCULATED: 59 MG/DL (ref 0–99)
LYMPHOCYTES ABSOLUTE: 1.59 E9/L (ref 1.5–4)
LYMPHOCYTES RELATIVE PERCENT: 38.9 % (ref 20–42)
MCH RBC QN AUTO: 31.9 PG (ref 26–35)
MCHC RBC AUTO-ENTMCNC: 32.5 % (ref 32–34.5)
MCV RBC AUTO: 98.1 FL (ref 80–99.9)
MONOCYTES ABSOLUTE: 0.41 E9/L (ref 0.1–0.95)
MONOCYTES RELATIVE PERCENT: 10 % (ref 2–12)
NEUTROPHILS ABSOLUTE: 1.95 E9/L (ref 1.8–7.3)
NEUTROPHILS RELATIVE PERCENT: 47.8 % (ref 43–80)
PDW BLD-RTO: 12.2 FL (ref 11.5–15)
PLATELET # BLD: 209 E9/L (ref 130–450)
PMV BLD AUTO: 12.2 FL (ref 7–12)
POTASSIUM SERPL-SCNC: 4.3 MMOL/L (ref 3.5–5)
RBC # BLD: 3.73 E12/L (ref 3.5–5.5)
SODIUM BLD-SCNC: 142 MMOL/L (ref 132–146)
T4 FREE: 1.85 NG/DL (ref 0.93–1.7)
TOTAL PROTEIN: 7.3 G/DL (ref 6.4–8.3)
TRIGL SERPL-MCNC: 138 MG/DL (ref 0–149)
TSH SERPL DL<=0.05 MIU/L-ACNC: 0.23 UIU/ML (ref 0.27–4.2)
VLDLC SERPL CALC-MCNC: 28 MG/DL
WBC # BLD: 4.1 E9/L (ref 4.5–11.5)

## 2021-04-20 ENCOUNTER — OFFICE VISIT (OUTPATIENT)
Dept: FAMILY MEDICINE CLINIC | Age: 71
End: 2021-04-20
Payer: MEDICARE

## 2021-04-20 VITALS
WEIGHT: 201.4 LBS | HEART RATE: 74 BPM | OXYGEN SATURATION: 96 % | SYSTOLIC BLOOD PRESSURE: 138 MMHG | BODY MASS INDEX: 31.61 KG/M2 | HEIGHT: 67 IN | DIASTOLIC BLOOD PRESSURE: 82 MMHG | TEMPERATURE: 97.9 F

## 2021-04-20 DIAGNOSIS — E08.65 DIABETES MELLITUS DUE TO UNDERLYING CONDITION, UNCONTROLLED, WITH HYPERGLYCEMIA (HCC): ICD-10-CM

## 2021-04-20 DIAGNOSIS — E03.9 ACQUIRED HYPOTHYROIDISM: Primary | ICD-10-CM

## 2021-04-20 DIAGNOSIS — Z12.11 SCREEN FOR COLON CANCER: ICD-10-CM

## 2021-04-20 DIAGNOSIS — E78.2 MIXED HYPERLIPIDEMIA: ICD-10-CM

## 2021-04-20 DIAGNOSIS — Z91.81 AT HIGH RISK FOR FALLS: ICD-10-CM

## 2021-04-20 DIAGNOSIS — M17.11 PRIMARY OSTEOARTHRITIS OF RIGHT KNEE: ICD-10-CM

## 2021-04-20 PROCEDURE — 99214 OFFICE O/P EST MOD 30 MIN: CPT | Performed by: FAMILY MEDICINE

## 2021-04-20 RX ORDER — LEVOTHYROXINE SODIUM 0.1 MG/1
100 TABLET ORAL DAILY
Qty: 90 TABLET | Refills: 1 | Status: SHIPPED
Start: 2021-04-20 | End: 2021-10-13 | Stop reason: SDUPTHER

## 2021-04-20 RX ORDER — LISINOPRIL 10 MG/1
10 TABLET ORAL DAILY
Qty: 90 TABLET | Refills: 1 | Status: SHIPPED
Start: 2021-04-20 | End: 2021-10-13 | Stop reason: SDUPTHER

## 2021-04-20 RX ORDER — METFORMIN HYDROCHLORIDE 500 MG/1
500 TABLET, EXTENDED RELEASE ORAL 2 TIMES DAILY WITH MEALS
Qty: 180 TABLET | Refills: 1 | Status: SHIPPED
Start: 2021-04-20 | End: 2021-10-13 | Stop reason: SDUPTHER

## 2021-04-20 RX ORDER — SIMVASTATIN 20 MG
TABLET ORAL
Qty: 90 TABLET | Refills: 1 | Status: SHIPPED
Start: 2021-04-20 | End: 2021-10-13 | Stop reason: SDUPTHER

## 2021-04-20 ASSESSMENT — PATIENT HEALTH QUESTIONNAIRE - PHQ9
SUM OF ALL RESPONSES TO PHQ QUESTIONS 1-9: 0
SUM OF ALL RESPONSES TO PHQ9 QUESTIONS 1 & 2: 0
1. LITTLE INTEREST OR PLEASURE IN DOING THINGS: 0

## 2021-04-20 ASSESSMENT — ENCOUNTER SYMPTOMS
NAUSEA: 0
TROUBLE SWALLOWING: 0
EYE PAIN: 0
DIARRHEA: 0
COUGH: 0
CHEST TIGHTNESS: 0
SORE THROAT: 0
ABDOMINAL PAIN: 0
VOMITING: 0
CONSTIPATION: 0
BACK PAIN: 0
SINUS PAIN: 0
SHORTNESS OF BREATH: 0
WHEEZING: 0

## 2021-04-20 NOTE — PROGRESS NOTES
On the basis of positive falls risk screening, assessment and plan is as follows: {desc; [facility] falls risk plan:[id number]}.

## 2021-04-20 NOTE — PROGRESS NOTES
Genitourinary: Negative for difficulty urinating, dysuria, frequency, hematuria, pelvic pain and urgency. Musculoskeletal: Positive for arthralgias. Negative for back pain, gait problem, joint swelling and myalgias. Skin: Negative for rash and wound. Neurological: Negative for dizziness, syncope, light-headedness and headaches. Hematological: Negative for adenopathy. Psychiatric/Behavioral: Negative for confusion, dysphoric mood, self-injury, sleep disturbance and suicidal ideas. The patient is not nervous/anxious.             Current Outpatient Medications:     levothyroxine (SYNTHROID) 100 MCG tablet, Take 1 tablet by mouth daily, Disp: 90 tablet, Rfl: 1    lisinopril (PRINIVIL;ZESTRIL) 10 MG tablet, Take 1 tablet by mouth daily, Disp: 90 tablet, Rfl: 1    metFORMIN (GLUCOPHAGE-XR) 500 MG extended release tablet, Take 1 tablet by mouth 2 times daily (with meals), Disp: 180 tablet, Rfl: 1    simvastatin (ZOCOR) 20 MG tablet, take 1 tablet by mouth at bedtime, Disp: 90 tablet, Rfl: 1    Cholecalciferol (VITAMIN D3) 5000 units TABS, Take 5,000 Units by mouth daily, Disp: , Rfl:     Ped Multivitamins-Fl-Iron (MULTI-VIT/FLUORIDE/IRON PO), Take by mouth daily, Disp: , Rfl:     vitamin C (ASCORBIC ACID) 500 MG tablet, Take 500 mg by mouth daily, Disp: , Rfl:   No Known Allergies   Past Medical History:   Diagnosis Date    Glaucoma     Headache     migraine    Hyperlipidemia     Hypothyroidism      Patient Active Problem List    Diagnosis Date Noted    Diabetes mellitus due to underlying condition, uncontrolled, with hyperglycemia ([facility] 11/22/2019    Hypothyroidism 05/13/2019    Hyperlipidemia 05/13/2019    Seasonal allergies 05/13/2019    Glaucoma 05/13/2019      Past Surgical History:   Procedure Laterality Date    OVARIAN CYST REMOVAL  1976    TONSILLECTOMY AND ADENOIDECTOMY      TRABECULECTOMY Right 2019      Social History     Tobacco History     Smoking Status  Never Smoker    Smokeless Tobacco Use  Never Used          Alcohol History     Alcohol Use Status  Not Asked Comment  Occasional          Drug Use     Drug Use Status  Not Asked          Sexual Activity     Sexually Active  Not Asked            /82   Pulse 74   Temp 97.9 °F (36.6 °C)   Ht 5' 7\" (1.702 m)   Wt 201 lb 6.4 oz (91.4 kg)   SpO2 96%   BMI 31.54 kg/m²     EXAM:   Physical Exam  Vitals signs and nursing note reviewed. Constitutional:       General: She is not in acute distress. Appearance: Normal appearance. She is well-developed. She is not ill-appearing. HENT:      Head: Normocephalic and atraumatic. Right Ear: Tympanic membrane normal.      Left Ear: Tympanic membrane normal.      Nose: Nose normal.      Mouth/Throat:      Mouth: Mucous membranes are moist.   Eyes:      Pupils: Pupils are equal, round, and reactive to light. Neck:      Musculoskeletal: Normal range of motion. Cardiovascular:      Rate and Rhythm: Normal rate and regular rhythm. Pulmonary:      Effort: Pulmonary effort is normal.      Breath sounds: Normal breath sounds. Abdominal:      General: Bowel sounds are normal.      Palpations: Abdomen is soft. Musculoskeletal:      Comments: Gait steady and balanced in the office today   Skin:     General: Skin is warm and dry. Comments: daibetic foot exam normal   Neurological:      General: No focal deficit present. Mental Status: She is alert and oriented to person, place, and time. Psychiatric:         Mood and Affect: Mood normal.         Thought Content: Thought content normal.          Jonathan George was seen today for diabetes, hypothyroidism, hyperlipidemia and hypertension. Diagnoses and all orders for this visit:    Acquired hypothyroidism  Comments:  continue dose @ 112mcg  labs reviewed and stable  Orders:  -     levothyroxine (SYNTHROID) 100 MCG tablet; Take 1 tablet by mouth daily  -     T4, Free; Future  -     TSH without Reflex;  Future    Diabetes mellitus due to underlying condition, uncontrolled, with hyperglycemia ([facility]  Comments:  continue metformin XR 2 pills daily   much better a1c now 6.6%  continue diet and checking sugars  Orders:  -     metFORMIN (GLUCOPHAGE-XR) 500 MG extended release tablet; Take 1 tablet by mouth 2 times daily (with meals)  -      DIABETES FOOT EXAM  -     CBC Auto Differential; Future  -     Comprehensive Metabolic Panel; Future  -     Hemoglobin A1C; Future  -     Microalbumin, Ur; Future    Mixed hyperlipidemia  Comments:  stable with medications  Orders:  -     simvastatin (ZOCOR) 20 MG tablet; take 1 tablet by mouth at bedtime  -     Lipid Panel; Future    Screen for colon cancer  Comments:  she agreed to cologuard  refuses colonoscopy  Orders:  -     Cologuard (For External Results Only); Future    At high risk for falls  Comments:  she has been exercising and wlaking daily  she has been dong fine    Primary osteoarthritis of right knee  Comments:  continue exercise  tylenol or voltaren gel prn  f/u if it worsens    Other orders  -     lisinopril (PRINIVIL;ZESTRIL) 10 MG tablet; Take 1 tablet by mouth daily    appt in 6 months  Labs prior  cologuard faxed      I independently reviewed and updated the chief complaint, HPI, past medical and surgical history, medications, allergies and ROS as entered by the LPN. Seen by:   Eleanor Parker DO

## 2021-05-07 DIAGNOSIS — Z12.11 SCREEN FOR COLON CANCER: ICD-10-CM

## 2021-08-02 ENCOUNTER — TELEPHONE (OUTPATIENT)
Dept: FAMILY MEDICINE CLINIC | Age: 71
End: 2021-08-02

## 2021-08-02 NOTE — TELEPHONE ENCOUNTER
----- Message from [employer] sent at 7/31/2021  9:56 AM EDT -----  Subject: Refill Request    QUESTIONS  Name of Medication? levothyroxine (SYNTHROID) 100 MCG tablet  Patient-reported dosage and instructions? 1 tablet daily  How many days do you have left? 0  Preferred Pharmacy? [address] phone number (if available)? [phone]  ---------------------------------------------------------------------------  --------------,  Name of Medication? lisinopril (PRINIVIL;ZESTRIL) 10 MG tablet  Patient-reported dosage and instructions? 1 tablet daily  How many days do you have left? 0  Preferred Pharmacy? [address] phone number (if available)? [phone]  ---------------------------------------------------------------------------  --------------,  Name of Medication? metFORMIN (GLUCOPHAGE-XR) 500 MG extended release   tablet  Patient-reported dosage and instructions? 2 tablets daily  How many days do you have left? 0  Preferred Pharmacy? [address] phone number (if available)? [phone]  ---------------------------------------------------------------------------  --------------,  Name of Medication? simvastatin (ZOCOR) 20 MG tablet  Patient-reported dosage and instructions? 1 tablet daily  How many days do you have left? 0  Preferred Pharmacy? [facility] [address] [facility] phone number (if available)? [phone]  Additional Information for Provider? Pt was told by her pharmacy that she   had no refills. Showing there is one  ---------------------------------------------------------------------------  --------------  CALL BACK INFO  What is the best way for the office to contact you? OK to leave message on   voicemail  Preferred Call Back Phone Number?  [phone]

## 2021-10-13 ENCOUNTER — OFFICE VISIT (OUTPATIENT)
Dept: FAMILY MEDICINE CLINIC | Age: 71
End: 2021-10-13
Payer: MEDICARE

## 2021-10-13 VITALS
WEIGHT: 205.4 LBS | OXYGEN SATURATION: 98 % | HEART RATE: 72 BPM | SYSTOLIC BLOOD PRESSURE: 132 MMHG | BODY MASS INDEX: 32.24 KG/M2 | HEIGHT: 67 IN | DIASTOLIC BLOOD PRESSURE: 82 MMHG | TEMPERATURE: 97.9 F

## 2021-10-13 DIAGNOSIS — E08.65 DIABETES MELLITUS DUE TO UNDERLYING CONDITION, UNCONTROLLED, WITH HYPERGLYCEMIA (HCC): ICD-10-CM

## 2021-10-13 DIAGNOSIS — N18.32 STAGE 3B CHRONIC KIDNEY DISEASE (HCC): ICD-10-CM

## 2021-10-13 DIAGNOSIS — E78.2 MIXED HYPERLIPIDEMIA: ICD-10-CM

## 2021-10-13 DIAGNOSIS — I10 ESSENTIAL HYPERTENSION: Primary | ICD-10-CM

## 2021-10-13 DIAGNOSIS — E03.9 ACQUIRED HYPOTHYROIDISM: ICD-10-CM

## 2021-10-13 PROCEDURE — 99214 OFFICE O/P EST MOD 30 MIN: CPT | Performed by: FAMILY MEDICINE

## 2021-10-13 RX ORDER — METFORMIN HYDROCHLORIDE 500 MG/1
500 TABLET, EXTENDED RELEASE ORAL 2 TIMES DAILY WITH MEALS
Qty: 180 TABLET | Refills: 0 | Status: SHIPPED
Start: 2021-10-13 | End: 2021-10-13 | Stop reason: SDUPTHER

## 2021-10-13 RX ORDER — LISINOPRIL 10 MG/1
10 TABLET ORAL DAILY
Qty: 90 TABLET | Refills: 0 | Status: SHIPPED | OUTPATIENT
Start: 2021-10-13 | End: 2022-04-19 | Stop reason: SDUPTHER

## 2021-10-13 RX ORDER — LEVOTHYROXINE SODIUM 0.1 MG/1
100 TABLET ORAL DAILY
Qty: 90 TABLET | Refills: 0 | Status: SHIPPED | OUTPATIENT
Start: 2021-10-13 | End: 2022-04-19 | Stop reason: SDUPTHER

## 2021-10-13 RX ORDER — SIMVASTATIN 20 MG
TABLET ORAL
Qty: 90 TABLET | Refills: 0 | Status: SHIPPED
Start: 2021-10-13 | End: 2021-10-13 | Stop reason: SDUPTHER

## 2021-10-13 RX ORDER — SIMVASTATIN 20 MG
TABLET ORAL
Qty: 90 TABLET | Refills: 0 | Status: SHIPPED | OUTPATIENT
Start: 2021-10-13 | End: 2022-04-19 | Stop reason: SDUPTHER

## 2021-10-13 RX ORDER — LEVOTHYROXINE SODIUM 0.1 MG/1
100 TABLET ORAL DAILY
Qty: 90 TABLET | Refills: 0 | Status: SHIPPED
Start: 2021-10-13 | End: 2021-10-13 | Stop reason: SDUPTHER

## 2021-10-13 RX ORDER — LISINOPRIL 10 MG/1
10 TABLET ORAL DAILY
Qty: 90 TABLET | Refills: 0 | Status: SHIPPED
Start: 2021-10-13 | End: 2021-10-13 | Stop reason: SDUPTHER

## 2021-10-13 RX ORDER — METFORMIN HYDROCHLORIDE 500 MG/1
500 TABLET, EXTENDED RELEASE ORAL 2 TIMES DAILY WITH MEALS
Qty: 180 TABLET | Refills: 0 | Status: SHIPPED | OUTPATIENT
Start: 2021-10-13 | End: 2022-04-19 | Stop reason: SDUPTHER

## 2021-10-13 ASSESSMENT — ENCOUNTER SYMPTOMS
VOMITING: 0
DIARRHEA: 0
ABDOMINAL PAIN: 0
WHEEZING: 0
TROUBLE SWALLOWING: 0
SINUS PAIN: 0
CONSTIPATION: 0
BACK PAIN: 0
SHORTNESS OF BREATH: 0
COUGH: 0
CHEST TIGHTNESS: 0
NAUSEA: 0
SORE THROAT: 0
EYE PAIN: 0

## 2021-10-13 NOTE — PROGRESS NOTES
10/13/21    Name: Jerrica Guardado  :1950   Sex:female   Age:71 y.o. Chief Complaint   Patient presents with    Diabetes    Hypothyroidism    Hyperlipidemia    Hypertension     Patient presents to office for visit. She did have flu shot. Patient had her labs done before appointment. She denies any issues today. Patient needs there first 90 days of her scripts sent to [facility] and the refill printed and dated for January, she will be going to [place]. Patient says she occasionally takes ibuprofen, but not often. Patient here for check up    Labs reviewed at length  cholestrol stable  Taking statin  And toleratingi t well    Diabetes much better  Watching diet and doing good  No changes  Also exercising to help sugars    Kidney disease  Has worsened  Not drinking enough fluids  She knows it  She needs to push the fluids  60-64 oz a day  Recheck labs in 6 months    Thyroid stable  No changesdoing well        Review of Systems   Constitutional: Negative for appetite change, fatigue and fever. HENT: Negative for congestion, ear pain, sinus pain, sore throat and trouble swallowing. Eyes: Negative for pain. Respiratory: Negative for cough, chest tightness, shortness of breath and wheezing. Cardiovascular: Negative for chest pain, palpitations and leg swelling. Gastrointestinal: Negative for abdominal pain, constipation, diarrhea, nausea and vomiting. Endocrine: Negative for cold intolerance and heat intolerance. Genitourinary: Negative for difficulty urinating, dysuria, frequency, hematuria, pelvic pain and urgency. Musculoskeletal: Negative for arthralgias, back pain, gait problem, joint swelling and myalgias. Skin: Negative for rash and wound. Neurological: Negative for dizziness, syncope, light-headedness and headaches. Hematological: Negative for adenopathy. Psychiatric/Behavioral: Negative for confusion, dysphoric mood, self-injury, sleep disturbance and suicidal ideas.  The patient is not nervous/anxious.             Current Outpatient Medications:     simvastatin (ZOCOR) 20 MG tablet, take 1 tablet by mouth at bedtime; fill after January 13th 2022, Disp: 90 tablet, Rfl: 0    metFORMIN (GLUCOPHAGE-XR) 500 MG extended release tablet, Take 1 tablet by mouth 2 times daily (with meals) Fill after January 13th 2022, Disp: 180 tablet, Rfl: 0    lisinopril (PRINIVIL;ZESTRIL) 10 MG tablet, Take 1 tablet by mouth daily Fill after January 13th 2022, Disp: 90 tablet, Rfl: 0    levothyroxine (SYNTHROID) 100 MCG tablet, Take 1 tablet by mouth daily Fill after January 13th 2022, Disp: 90 tablet, Rfl: 0    Cholecalciferol (VITAMIN D3) 5000 units TABS, Take 5,000 Units by mouth daily, Disp: , Rfl:     Ped Multivitamins-Fl-Iron (MULTI-VIT/FLUORIDE/IRON PO), Take by mouth daily, Disp: , Rfl:     vitamin C (ASCORBIC ACID) 500 MG tablet, Take 500 mg by mouth daily, Disp: , Rfl:   No Known Allergies   Past Medical History:   Diagnosis Date    Glaucoma     Headache     migraine    Hyperlipidemia     Hypothyroidism      Patient Active Problem List    Diagnosis Date Noted    [facility], [facility]    Hypothyroidism 05/13/2019    Hyperlipidemia 05/13/2019    Seasonal allergies 05/13/2019    Glaucoma 05/13/2019      Past Surgical History:   Procedure Laterality Date    OVARIAN CYST REMOVAL  1976    TONSILLECTOMY AND ADENOIDECTOMY      TRABECULECTOMY Right 2019      Social History     Tobacco History     Smoking Status  Never Smoker    Smokeless Tobacco Use  Never Used          Alcohol History     Alcohol Use Status  Not Asked Comment  Occasional          Drug Use     Drug Use Status  Not Asked          Sexual Activity     Sexually Active  Not Asked            /82   Pulse 72   Temp 97.9 °F (36.6 °C)   Ht 5' 7\" (1.702 m)   Wt 205 lb 6.4 oz (93.2 kg)   SpO2 98%   BMI 32.17 kg/m²     EXAM:   Physical Exam  Vitals and nursing note reviewed. Constitutional:       General: She is not in acute distress. Appearance: Normal appearance. She is well-developed. She is not ill-appearing. HENT:      Head: Normocephalic and atraumatic. Right Ear: Tympanic membrane normal.      Left Ear: Tympanic membrane normal.      Nose: Nose normal.      Mouth/Throat:      Mouth: Mucous membranes are moist.   Eyes:      Pupils: Pupils are equal, round, and reactive to light. Cardiovascular:      Rate and Rhythm: Normal rate and regular rhythm. Pulmonary:      Effort: Pulmonary effort is normal.      Breath sounds: Normal breath sounds. Abdominal:      General: Bowel sounds are normal.      Palpations: Abdomen is soft. Musculoskeletal:      Cervical back: Normal range of motion. Comments: Gait steady in the office today   Skin:     General: Skin is warm and dry. Neurological:      Mental Status: She is alert and oriented to person, place, and time. Mental status is at baseline. Psychiatric:         Mood and Affect: Mood normal.         Thought Content: Thought content normal.          Joshua Watkins was seen today for diabetes, hypothyroidism, hyperlipidemia and hypertension. Diagnoses and all orders for this visit:    Essential hypertension  Comments:  has been stbale  readings good  no changes in meds  Orders:  -     Discontinue: lisinopril (PRINIVIL;ZESTRIL) 10 MG tablet; Take 1 tablet by mouth daily  -     lisinopril (PRINIVIL;ZESTRIL) 10 MG tablet; Take 1 tablet by mouth daily Fill after January 13th 2022  -     CBC Auto Differential; Future  -     Comprehensive Metabolic Panel; Future    Mixed hyperlipidemia  Comments:  stable with medications  tolerating statin  really good  Orders:  -     Discontinue: simvastatin (ZOCOR) 20 MG tablet; take 1 tablet by mouth at bedtime  -     simvastatin (ZOCOR) 20 MG tablet; take 1 tablet by mouth at bedtime; fill after January 13th 2022  -     Lipid Panel;  Future    Di[facility], [facility]  Comments:  continue metformin XR 2 pills daily   much better a1c now 6.6%  continue diet and checking sugars  Orders:  -     Discontinue: metFORMIN (GLUCOPHAGE-XR) 500 MG extended release tablet; Take 1 tablet by mouth 2 times daily (with meals)  -     metFORMIN (GLUCOPHAGE-XR) 500 MG extended release tablet; Take 1 tablet by mouth 2 times daily (with meals) Fill after January 13th 2022  -     Hemoglobin A1C; Future  -     Microalbumin, Ur; Future    Acquired hypothyroidism  Comments:  continue dose @ [facility]  labs reviewed and stable  Orders:  -     Discontinue: levothyroxine (SYNTHROID) 100 MCG tablet; Take 1 tablet by mouth daily  -     levothyroxine (SYNTHROID) 100 MCG tablet; Take 1 tablet by mouth daily Fill after January 13th 2022  -     T4, Free; Future  -     TSH without Reflex; Future    Stage 3b chronic kidney disease ([facility]  Comments:  not drinking enough water or fluids period  she will get better and no nsaids, tylenol as needed  Orders:  -     Microalbumin, Ur; Future        I independently reviewed and updated the chief complaint, HPI, past medical and surgical history, medications, allergies and ROS as entered by the LPN. Seen by:   Marin Ziegler DO

## 2022-04-18 DIAGNOSIS — N18.32 STAGE 3B CHRONIC KIDNEY DISEASE (HCC): ICD-10-CM

## 2022-04-18 DIAGNOSIS — E78.2 MIXED HYPERLIPIDEMIA: ICD-10-CM

## 2022-04-18 DIAGNOSIS — E03.9 ACQUIRED HYPOTHYROIDISM: ICD-10-CM

## 2022-04-18 DIAGNOSIS — E08.65 DIABETES MELLITUS DUE TO UNDERLYING CONDITION, UNCONTROLLED, WITH HYPERGLYCEMIA (HCC): ICD-10-CM

## 2022-04-18 DIAGNOSIS — I10 ESSENTIAL HYPERTENSION: ICD-10-CM

## 2022-04-18 LAB
ALBUMIN SERPL-MCNC: 4.8 G/DL (ref 3.5–5.2)
ALP BLD-CCNC: 67 U/L (ref 35–104)
ALT SERPL-CCNC: 14 U/L (ref 0–32)
ANION GAP SERPL CALCULATED.3IONS-SCNC: 19 MMOL/L (ref 7–16)
AST SERPL-CCNC: 20 U/L (ref 0–31)
BASOPHILS ABSOLUTE: 0.02 E9/L (ref 0–0.2)
BASOPHILS RELATIVE PERCENT: 0.4 % (ref 0–2)
BILIRUB SERPL-MCNC: 0.5 MG/DL (ref 0–1.2)
BUN BLDV-MCNC: 25 MG/DL (ref 6–23)
CALCIUM SERPL-MCNC: 10 MG/DL (ref 8.6–10.2)
CHLORIDE BLD-SCNC: 105 MMOL/L (ref 98–107)
CHOLESTEROL, TOTAL: 150 MG/DL (ref 0–199)
CO2: 21 MMOL/L (ref 22–29)
CREAT SERPL-MCNC: 1.2 MG/DL (ref 0.5–1)
EOSINOPHILS ABSOLUTE: 0.19 E9/L (ref 0.05–0.5)
EOSINOPHILS RELATIVE PERCENT: 4.1 % (ref 0–6)
GFR AFRICAN AMERICAN: 53
GFR NON-AFRICAN AMERICAN: 44 ML/MIN/1.73
GLUCOSE BLD-MCNC: 122 MG/DL (ref 74–99)
HBA1C MFR BLD: 5.7 % (ref 4–5.6)
HCT VFR BLD CALC: 36.4 % (ref 34–48)
HDLC SERPL-MCNC: 51 MG/DL
HEMOGLOBIN: 11.6 G/DL (ref 11.5–15.5)
IMMATURE GRANULOCYTES #: 0.01 E9/L
IMMATURE GRANULOCYTES %: 0.2 % (ref 0–5)
LDL CHOLESTEROL CALCULATED: 64 MG/DL (ref 0–99)
LYMPHOCYTES ABSOLUTE: 1.96 E9/L (ref 1.5–4)
LYMPHOCYTES RELATIVE PERCENT: 42.1 % (ref 20–42)
MCH RBC QN AUTO: 31.5 PG (ref 26–35)
MCHC RBC AUTO-ENTMCNC: 31.9 % (ref 32–34.5)
MCV RBC AUTO: 98.9 FL (ref 80–99.9)
MICROALBUMIN UR-MCNC: 26.6 MG/L
MONOCYTES ABSOLUTE: 0.44 E9/L (ref 0.1–0.95)
MONOCYTES RELATIVE PERCENT: 9.4 % (ref 2–12)
NEUTROPHILS ABSOLUTE: 2.04 E9/L (ref 1.8–7.3)
NEUTROPHILS RELATIVE PERCENT: 43.8 % (ref 43–80)
PDW BLD-RTO: 12.4 FL (ref 11.5–15)
PLATELET # BLD: 220 E9/L (ref 130–450)
PMV BLD AUTO: 11.7 FL (ref 7–12)
POTASSIUM SERPL-SCNC: 4.3 MMOL/L (ref 3.5–5)
RBC # BLD: 3.68 E12/L (ref 3.5–5.5)
SODIUM BLD-SCNC: 145 MMOL/L (ref 132–146)
T4 FREE: 1.79 NG/DL (ref 0.93–1.7)
TOTAL PROTEIN: 7.6 G/DL (ref 6.4–8.3)
TRIGL SERPL-MCNC: 176 MG/DL (ref 0–149)
TSH SERPL DL<=0.05 MIU/L-ACNC: 1.22 UIU/ML (ref 0.27–4.2)
VLDLC SERPL CALC-MCNC: 35 MG/DL
WBC # BLD: 4.7 E9/L (ref 4.5–11.5)

## 2022-04-19 ENCOUNTER — OFFICE VISIT (OUTPATIENT)
Dept: FAMILY MEDICINE CLINIC | Age: 72
End: 2022-04-19
Payer: MEDICARE

## 2022-04-19 VITALS
HEIGHT: 67 IN | WEIGHT: 204.2 LBS | TEMPERATURE: 98.5 F | HEART RATE: 78 BPM | SYSTOLIC BLOOD PRESSURE: 138 MMHG | DIASTOLIC BLOOD PRESSURE: 82 MMHG | BODY MASS INDEX: 32.05 KG/M2 | OXYGEN SATURATION: 95 %

## 2022-04-19 VITALS
TEMPERATURE: 98.5 F | BODY MASS INDEX: 32.04 KG/M2 | DIASTOLIC BLOOD PRESSURE: 82 MMHG | HEART RATE: 78 BPM | OXYGEN SATURATION: 95 % | HEIGHT: 67 IN | WEIGHT: 204.15 LBS | SYSTOLIC BLOOD PRESSURE: 138 MMHG

## 2022-04-19 DIAGNOSIS — N18.32 STAGE 3B CHRONIC KIDNEY DISEASE (HCC): ICD-10-CM

## 2022-04-19 DIAGNOSIS — E03.9 ACQUIRED HYPOTHYROIDISM: ICD-10-CM

## 2022-04-19 DIAGNOSIS — E78.2 MIXED HYPERLIPIDEMIA: ICD-10-CM

## 2022-04-19 DIAGNOSIS — E08.65 DIABETES MELLITUS DUE TO UNDERLYING CONDITION, UNCONTROLLED, WITH HYPERGLYCEMIA (HCC): ICD-10-CM

## 2022-04-19 DIAGNOSIS — I10 ESSENTIAL HYPERTENSION: Primary | ICD-10-CM

## 2022-04-19 DIAGNOSIS — Z00.00 MEDICARE ANNUAL WELLNESS VISIT, SUBSEQUENT: Primary | ICD-10-CM

## 2022-04-19 PROCEDURE — 99214 OFFICE O/P EST MOD 30 MIN: CPT | Performed by: FAMILY MEDICINE

## 2022-04-19 PROCEDURE — G0439 PPPS, SUBSEQ VISIT: HCPCS | Performed by: FAMILY MEDICINE

## 2022-04-19 PROCEDURE — 3288F FALL RISK ASSESSMENT DOCD: CPT | Performed by: FAMILY MEDICINE

## 2022-04-19 RX ORDER — METFORMIN HYDROCHLORIDE 500 MG/1
500 TABLET, EXTENDED RELEASE ORAL 2 TIMES DAILY WITH MEALS
Qty: 180 TABLET | Refills: 1 | Status: SHIPPED
Start: 2022-04-19 | End: 2022-10-25 | Stop reason: SDUPTHER

## 2022-04-19 RX ORDER — LEVOTHYROXINE SODIUM 0.1 MG/1
100 TABLET ORAL DAILY
Qty: 90 TABLET | Refills: 1 | Status: SHIPPED
Start: 2022-04-19 | End: 2022-10-25 | Stop reason: SDUPTHER

## 2022-04-19 RX ORDER — LISINOPRIL 10 MG/1
10 TABLET ORAL 2 TIMES DAILY
Qty: 180 TABLET | Refills: 1 | Status: SHIPPED
Start: 2022-04-19 | End: 2022-10-25 | Stop reason: SDUPTHER

## 2022-04-19 RX ORDER — SIMVASTATIN 20 MG
TABLET ORAL
Qty: 90 TABLET | Refills: 1 | Status: SHIPPED
Start: 2022-04-19 | End: 2022-10-25 | Stop reason: SDUPTHER

## 2022-04-19 ASSESSMENT — ENCOUNTER SYMPTOMS
EYE PAIN: 0
TROUBLE SWALLOWING: 0
SHORTNESS OF BREATH: 0
BACK PAIN: 0
CHEST TIGHTNESS: 0
ABDOMINAL PAIN: 0
DIARRHEA: 0
WHEEZING: 0
VOMITING: 0
CONSTIPATION: 0
NAUSEA: 0
SORE THROAT: 0
COUGH: 0
SINUS PAIN: 0

## 2022-04-19 ASSESSMENT — LIFESTYLE VARIABLES: HOW OFTEN DO YOU HAVE A DRINK CONTAINING ALCOHOL: NEVER

## 2022-04-19 ASSESSMENT — PATIENT HEALTH QUESTIONNAIRE - PHQ9
SUM OF ALL RESPONSES TO PHQ9 QUESTIONS 1 & 2: 0
1. LITTLE INTEREST OR PLEASURE IN DOING THINGS: 0
2. FEELING DOWN, DEPRESSED OR HOPELESS: 0
SUM OF ALL RESPONSES TO PHQ QUESTIONS 1-9: 0

## 2022-04-19 NOTE — PATIENT INSTRUCTIONS
Personalized Preventive Plan for Leona Estevez - 4/19/2022  Medicare offers a range of preventive health benefits. Some of the tests and screenings are paid in full while other may be subject to a deductible, co-insurance, and/or copay. Some of these benefits include a comprehensive review of your medical history including lifestyle, illnesses that may run in your family, and various assessments and screenings as appropriate. After reviewing your medical record and screening and assessments performed today your provider may have ordered immunizations, labs, imaging, and/or referrals for you. A list of these orders (if applicable) as well as your Preventive Care list are included within your After Visit Summary for your review. Other Preventive Recommendations:    · A preventive eye exam performed by an eye specialist is recommended every 1-2 years to screen for glaucoma; cataracts, macular degeneration, and other eye disorders. · A preventive dental visit is recommended every 6 months. · Try to get at least 150 minutes of exercise per week or 10,000 steps per day on a pedometer . · Order or download the FREE \"Exercise & Physical Activity: Your Everyday Guide\" from [employer]. Call 2-884.305.5930 or search [employer] online. · You need 4553-3220 mg of calcium and 0207-5416 IU of vitamin D per day. It is possible to meet your calcium requirement with diet alone, but a vitamin D supplement is usually necessary to meet this goal.  · When exposed to the sun, use a sunscreen that protects against both UVA and UVB radiation with an SPF of 30 or greater. Reapply every 2 to 3 hours or after sweating, drying off with a towel, or swimming. · Always wear a seat belt when traveling in a car. Always wear a helmet when riding a bicycle or motorcycle.

## 2022-04-19 NOTE — PROGRESS NOTES
22    Name: Marianne Gonzalez  :1950   Sex:female   Age:72 y.o. Chief Complaint   Patient presents with    Hypertension    Hypothyroidism    Hyperlipidemia    Diabetes     Patient presents to office for visit. She did have labs done. Patient just returned from [place] last week. She did have covid booster sometime in January. She denies any medication changes. Patient denies any issues today. Here for a check up    HTN   Not checking it  It has been elevated consistantly  Will increase lisinopril to just mya a day at 10mg bid  She agrees  She will also try to get more steps in daily  Encouraged 60oz fluids daily  Low salt high potassium diet    Hypothyroidism  Doing well  She is taking meds  Labs good  Stable  No s/s so no changes in dose even with slightly elevated T4    Diabetes  Doing very good a1c lower again at 5.7%  She is watching sugar intake and taking metformin  No side effects    hyperlipidemis  Great  On statin and doing well  cotninue to watch diet'  No changes in medicaitons    Encouraged more steps  Mammogram at the end of the year      Review of Systems   Constitutional: Negative for appetite change, fatigue and fever. HENT: Negative for congestion, ear pain, sinus pain, sore throat and trouble swallowing. Eyes: Negative for pain. Respiratory: Negative for cough, chest tightness, shortness of breath and wheezing. Cardiovascular: Negative for chest pain, palpitations and leg swelling. Gastrointestinal: Negative for abdominal pain, constipation, diarrhea, nausea and vomiting. Endocrine: Negative for cold intolerance and heat intolerance. Genitourinary: Negative for difficulty urinating, dysuria, frequency, hematuria and pelvic pain. Musculoskeletal: Negative for arthralgias, back pain, gait problem, joint swelling and myalgias. Skin: Negative for rash and wound. Neurological: Negative for dizziness, syncope, light-headedness and headaches.    Hematological: Negative for adenopathy. Psychiatric/Behavioral: Negative for confusion, dysphoric mood, self-injury, sleep disturbance and suicidal ideas. The patient is not nervous/anxious.             Current Outpatient Medications:     levothyroxine (SYNTHROID) 100 MCG tablet, Take 1 tablet by mouth daily, Disp: 90 tablet, Rfl: 1    lisinopril (PRINIVIL;ZESTRIL) 10 MG tablet, Take 1 tablet by mouth 2 times daily, Disp: 180 tablet, Rfl: 1    metFORMIN (GLUCOPHAGE-XR) 500 MG extended release tablet, Take 1 tablet by mouth 2 times daily (with meals), Disp: 180 tablet, Rfl: 1    simvastatin (ZOCOR) 20 MG tablet, take 1 tablet by mouth at bedtime, Disp: 90 tablet, Rfl: 1    Cholecalciferol (VITAMIN D3) 5000 units TABS, Take 5,000 Units by mouth daily, Disp: , Rfl:     Ped Multivitamins-Fl-Iron (MULTI-VIT/FLUORIDE/IRON PO), Take by mouth daily, Disp: , Rfl:     vitamin C (ASCORBIC ACID) 500 MG tablet, Take 500 mg by mouth daily, Disp: , Rfl:   No Known Allergies   Past Medical History:   Diagnosis Date    Glaucoma     Headache     migraine    Hyperlipidemia     Hypothyroidism      Patient Active Problem List    Diagnosis Date Noted    [facility]    Diabetes mellitus due to underlying condition, uncontrolled, with hyperglycemia [facility] 11/22/2019    Hypothyroidism 05/13/2019    Hyperlipidemia 05/13/2019    Seasonal allergies 05/13/2019    Glaucoma 05/13/2019      Past Surgical History:   Procedure Laterality Date    OVARIAN CYST REMOVAL  1976    TONSILLECTOMY AND ADENOIDECTOMY      TRABECULECTOMY Right 2019      Social History     Tobacco History     Smoking Status  Never Smoker    Smokeless Tobacco Use  Never Used          Alcohol History     Alcohol Use Status  Not Asked Comment  Occasional          Drug Use     Drug Use Status  Not Asked          Sexual Activity     Sexually Active  Not Asked            /82   Pulse 78   Temp 98.5 °F (36.9 °C)   Ht 5' 7\" (1.702 m) Wt 204 lb 3.2 oz (92.6 kg)   SpO2 95%   BMI 31.98 kg/m²     EXAM:   Physical Exam  Vitals and nursing note reviewed. Constitutional:       General: She is not in acute distress. Appearance: She is well-developed. She is not ill-appearing. HENT:      Head: Normocephalic and atraumatic. Right Ear: Tympanic membrane normal.      Left Ear: Tympanic membrane normal.      Nose: Nose normal.      Mouth/Throat:      Mouth: Mucous membranes are moist.   Eyes:      Pupils: Pupils are equal, round, and reactive to light. Cardiovascular:      Rate and Rhythm: Normal rate and regular rhythm. Pulmonary:      Effort: Pulmonary effort is normal.      Breath sounds: Normal breath sounds. Abdominal:      General: Bowel sounds are normal.      Palpations: Abdomen is soft. Musculoskeletal:      Cervical back: Normal range of motion. Comments: Gait steady in the office today   Skin:     General: Skin is warm and dry. Neurological:      Mental Status: She is alert and oriented to person, place, and time. Mental status is at baseline. Psychiatric:         Mood and Affect: Mood normal.         Thought Content: Thought content normal.          Susan Jones was seen today for hypertension, hypothyroidism, hyperlipidemia and diabetes. Diagnoses and all orders for this visit:    Essential hypertension  Comments:  elevated  pretty consistantly  will increase lisinopril to 10mg bid  she will start to monitor it  more walking and more steps daily  Orders:  -     lisinopril (PRINIVIL;ZESTRIL) 10 MG tablet; Take 1 tablet by mouth 2 times daily  -     CBC with Auto Differential; Future  -     Comprehensive Metabolic Panel; Future    Acquired hypothyroidism  Comments:  continue dose @ 112mcg  labs reviewed and stable  [id number] but she denies anyt s/s   labs in 6 mos  Orders:  -     levothyroxine (SYNTHROID) 100 MCG tablet; Take 1 tablet by mouth daily  -     TSH;  Future    Diabetes mellitus due to underlying condition, uncontrolled[facility]  Comments:  continue metformin XR 2 pills daily   much better a1c now 5.7%  continue diet and checking sugars  Orders:  -     metFORMIN (GLUCOPHAGE-XR) 500 MG extended release tablet; Take 1 tablet by mouth 2 times daily (with meals)  -     Hemoglobin A1C; Future  -     Microalbumin, Ur; Future    Mixed hyperlipidemia  Comments:  stable with medications  tolerating statin  really good  Orders:  -     simvastatin (ZOCOR) 20 MG tablet; take 1 tablet by mouth at bedtime  -     Lipid Panel; Future    Stage 3b chronic kidney disease ([facility]  Comments:  again encouraged to drink more fluids  60oz daily  increase lisinopril to 10mg bid  recheck labs in 6 months        I independently reviewed and updated the chief complaint, HPI, past medical and surgical history, medications, allergies and ROS as entered by the LPN. Seen by:   Jesus Amaya DO

## 2022-04-19 NOTE — PROGRESS NOTES
Medicare Annual Wellness Visit    Zulay Kim is here for Medicare AWV    Assessment & Plan   Medicare annual wellness visit, subsequent      Recommendations for Preventive Services Due: see orders and patient instructions/AVS.  Recommended screening schedule for the next 5-10 years is provided to the patient in written form: see Patient Instructions/AVS.     Return for Medicare Annual Wellness Visit in 1 year. Subjective   The following acute and/or chronic problems were also addressed today:  Diabetes, chronic kidney disease and blood pressures    Patient's complete Health Risk Assessment and screening values have been reviewed and are found in Flowsheets. The following problems were reviewed today and where indicated follow up appointments were made and/or referrals ordered.     Positive Risk Factor Screenings with Interventions:               General Health and ACP:  General  In general, how would you say your health is?: Good  In the past 7 days, have you experienced any of the following: New or Increased Pain, New or Increased Fatigue, Loneliness, Social Isolation, Stress or Anger?: No  Do you get the social and emotional support that you need?: Yes  Do you have a Living Will?: (!) No    Advance Directives     Power of  Living Will ACP-Advance Directive ACP-Power of     Not on File Not on File Not on File Not on File      General Health Risk Interventions:  · No Living Will: ACP documents already completed- patient asked to provide copy to the office    Health Habits/Nutrition:     Physical Activity: Insufficiently Active    Days of Exercise per Week: 1 day    Minutes of Exercise per Session: 10 min     Have you lost any weight without trying in the past 3 months?: No  Body mass index: (!) 31.97  Have you seen the dentist within the past year?: Yes    Health Habits/Nutrition Interventions:  · Inadequate physical activity:  patient agrees to wear a pedometer and walk at least 10,000 steps/day, educational materials provided to promote increased physical activity  · Nutritional issues:  educational materials for healthy, well-balanced diet provided, educational materials to promote weight loss provided             Objective   Vitals:    04/19/22 0831   BP: 138/82   Pulse: 78   Temp: 98.5 °F (36.9 °C)   SpO2: 95%   Weight: 204 lb 2.3 oz (92.6 kg)   Height: 5' 7\" (1.702 m)      Body mass index is 31.97 kg/m². General Appearance: alert and oriented to person, place and time, well developed and well- nourished, in no acute distress  Skin: warm and dry, no rash or erythema  Head: normocephalic and atraumatic  Eyes: pupils equal, round, and reactive to light, extraocular eye movements intact, conjunctivae normal  ENT: tympanic membrane, external ear and ear canal normal bilaterally, nose without deformity, nasal mucosa and turbinates normal without polyps  Neck: supple and non-tender without mass, no thyromegaly or thyroid nodules, no cervical lymphadenopathy  Pulmonary/Chest: clear to auscultation bilaterally- no wheezes, rales or rhonchi, normal air movement, no respiratory distress  Cardiovascular: normal rate, regular rhythm, normal S1 and S2, no murmurs, rubs, clicks, or gallops, distal pulses intact, no carotid bruits  Abdomen: soft, non-tender, non-distended, normal bowel sounds, no masses or organomegaly  Extremities: no cyanosis, clubbing or edema  Musculoskeletal: normal range of motion, no joint swelling, deformity or tenderness  Neurologic: reflexes normal and symmetric, no cranial nerve deficit, gait, coordination and speech normal       No Known Allergies  Prior to Visit Medications    Medication Sig Taking?  Authorizing Provider   levothyroxine (SYNTHROID) 100 MCG tablet Take 1 tablet by mouth daily  Gloria Gonzales DO   lisinopril (PRINIVIL;ZESTRIL) 10 MG tablet Take 1 tablet by mouth 2 times daily  Gloria Gonzales DO   metFORMIN (GLUCOPHAGE-XR) 500 MG extended release tablet Take 1 tablet by mouth 2 times daily (with meals)  Katlyn Camacho DO   simvastatin (ZOCOR) 20 MG tablet take 1 tablet by mouth at bedtime  Katlyn Camacho DO   Cholecalciferol (VITAMIN D3) 5000 units TABS Take 5,000 Units by mouth daily  Historical Provider, MD   Ped Multivitamins-Fl-Iron (MULTI-VIT/FLUORIDE/IRON PO) Take by mouth daily  Historical Provider, MD   vitamin C (ASCORBIC ACID) 500 MG tablet Take 500 mg by mouth daily  Historical Provider, MD Hinojosa (Including outside providers/suppliers regularly involved in providing care):   Patient Care Team:  Katlyn Camacho DO as PCP - General (Family Medicine)  Katlyn Camacho DO as PCP - REHABILITATION [facility] Empaneled Provider    Reviewed and updated this visit:  Tobacco  Allergies  Meds  Problems  Med Hx  Surg Hx  Soc Hx  Fam Hx

## 2022-10-09 DIAGNOSIS — I10 ESSENTIAL HYPERTENSION: ICD-10-CM

## 2022-10-09 DIAGNOSIS — E08.65 DIABETES MELLITUS DUE TO UNDERLYING CONDITION, UNCONTROLLED, WITH HYPERGLYCEMIA (HCC): ICD-10-CM

## 2022-10-09 DIAGNOSIS — E78.2 MIXED HYPERLIPIDEMIA: ICD-10-CM

## 2022-10-09 DIAGNOSIS — E03.9 ACQUIRED HYPOTHYROIDISM: ICD-10-CM

## 2022-10-11 RX ORDER — SIMVASTATIN 20 MG
TABLET ORAL
Qty: 90 TABLET | Refills: 1 | OUTPATIENT
Start: 2022-10-11

## 2022-10-11 RX ORDER — LISINOPRIL 10 MG/1
TABLET ORAL
Qty: 180 TABLET | Refills: 1 | OUTPATIENT
Start: 2022-10-11

## 2022-10-11 RX ORDER — METFORMIN HYDROCHLORIDE 500 MG/1
TABLET, EXTENDED RELEASE ORAL
Qty: 180 TABLET | Refills: 1 | OUTPATIENT
Start: 2022-10-11

## 2022-10-11 RX ORDER — LEVOTHYROXINE SODIUM 0.1 MG/1
TABLET ORAL
Qty: 90 TABLET | Refills: 1 | OUTPATIENT
Start: 2022-10-11

## 2022-10-13 LAB — MAMMOGRAPHY, EXTERNAL: NORMAL

## 2022-10-24 DIAGNOSIS — E78.2 MIXED HYPERLIPIDEMIA: ICD-10-CM

## 2022-10-24 DIAGNOSIS — E03.9 ACQUIRED HYPOTHYROIDISM: ICD-10-CM

## 2022-10-24 DIAGNOSIS — E08.65 DIABETES MELLITUS DUE TO UNDERLYING CONDITION, UNCONTROLLED, WITH HYPERGLYCEMIA (HCC): ICD-10-CM

## 2022-10-24 DIAGNOSIS — I10 ESSENTIAL HYPERTENSION: ICD-10-CM

## 2022-10-24 LAB
ALBUMIN SERPL-MCNC: 4.5 G/DL (ref 3.5–5.2)
ALP BLD-CCNC: 72 U/L (ref 35–104)
ALT SERPL-CCNC: 10 U/L (ref 0–32)
ANION GAP SERPL CALCULATED.3IONS-SCNC: 13 MMOL/L (ref 7–16)
AST SERPL-CCNC: 15 U/L (ref 0–31)
BASOPHILS ABSOLUTE: 0.03 E9/L (ref 0–0.2)
BASOPHILS RELATIVE PERCENT: 0.6 % (ref 0–2)
BILIRUB SERPL-MCNC: 0.7 MG/DL (ref 0–1.2)
BUN BLDV-MCNC: 19 MG/DL (ref 6–23)
CALCIUM SERPL-MCNC: 10.3 MG/DL (ref 8.6–10.2)
CHLORIDE BLD-SCNC: 98 MMOL/L (ref 98–107)
CHOLESTEROL, TOTAL: 163 MG/DL (ref 0–199)
CO2: 26 MMOL/L (ref 22–29)
CREAT SERPL-MCNC: 1.1 MG/DL (ref 0.5–1)
EOSINOPHILS ABSOLUTE: 0.12 E9/L (ref 0.05–0.5)
EOSINOPHILS RELATIVE PERCENT: 2.5 % (ref 0–6)
GFR SERPL CREATININE-BSD FRML MDRD: 53 ML/MIN/1.73
GLUCOSE BLD-MCNC: 109 MG/DL (ref 74–99)
HBA1C MFR BLD: 5.9 % (ref 4–5.6)
HCT VFR BLD CALC: 40.3 % (ref 34–48)
HDLC SERPL-MCNC: 49 MG/DL
HEMOGLOBIN: 13 G/DL (ref 11.5–15.5)
IMMATURE GRANULOCYTES #: 0.01 E9/L
IMMATURE GRANULOCYTES %: 0.2 % (ref 0–5)
LDL CHOLESTEROL CALCULATED: 77 MG/DL (ref 0–99)
LYMPHOCYTES ABSOLUTE: 1.63 E9/L (ref 1.5–4)
LYMPHOCYTES RELATIVE PERCENT: 33.7 % (ref 20–42)
MCH RBC QN AUTO: 32.3 PG (ref 26–35)
MCHC RBC AUTO-ENTMCNC: 32.3 % (ref 32–34.5)
MCV RBC AUTO: 100 FL (ref 80–99.9)
MICROALBUMIN UR-MCNC: 110.7 MG/L
MONOCYTES ABSOLUTE: 0.41 E9/L (ref 0.1–0.95)
MONOCYTES RELATIVE PERCENT: 8.5 % (ref 2–12)
NEUTROPHILS ABSOLUTE: 2.63 E9/L (ref 1.8–7.3)
NEUTROPHILS RELATIVE PERCENT: 54.5 % (ref 43–80)
PDW BLD-RTO: 12.3 FL (ref 11.5–15)
PLATELET # BLD: 261 E9/L (ref 130–450)
PMV BLD AUTO: 12.1 FL (ref 7–12)
POTASSIUM SERPL-SCNC: 4.4 MMOL/L (ref 3.5–5)
RBC # BLD: 4.03 E12/L (ref 3.5–5.5)
SODIUM BLD-SCNC: 137 MMOL/L (ref 132–146)
TOTAL PROTEIN: 7.7 G/DL (ref 6.4–8.3)
TRIGL SERPL-MCNC: 186 MG/DL (ref 0–149)
TSH SERPL DL<=0.05 MIU/L-ACNC: 3.89 UIU/ML (ref 0.27–4.2)
VLDLC SERPL CALC-MCNC: 37 MG/DL
WBC # BLD: 4.8 E9/L (ref 4.5–11.5)

## 2022-10-25 ENCOUNTER — OFFICE VISIT (OUTPATIENT)
Dept: FAMILY MEDICINE CLINIC | Age: 72
End: 2022-10-25
Payer: MEDICARE

## 2022-10-25 VITALS
DIASTOLIC BLOOD PRESSURE: 80 MMHG | TEMPERATURE: 98 F | SYSTOLIC BLOOD PRESSURE: 138 MMHG | HEART RATE: 78 BPM | OXYGEN SATURATION: 98 % | WEIGHT: 200.2 LBS | BODY MASS INDEX: 31.42 KG/M2 | HEIGHT: 67 IN

## 2022-10-25 DIAGNOSIS — E03.9 ACQUIRED HYPOTHYROIDISM: Primary | ICD-10-CM

## 2022-10-25 DIAGNOSIS — I10 ESSENTIAL HYPERTENSION: ICD-10-CM

## 2022-10-25 DIAGNOSIS — E08.65 DIABETES MELLITUS DUE TO UNDERLYING CONDITION, UNCONTROLLED, WITH HYPERGLYCEMIA (HCC): ICD-10-CM

## 2022-10-25 DIAGNOSIS — E78.2 MIXED HYPERLIPIDEMIA: ICD-10-CM

## 2022-10-25 DIAGNOSIS — J30.2 SEASONAL ALLERGIC RHINITIS, UNSPECIFIED TRIGGER: ICD-10-CM

## 2022-10-25 PROCEDURE — 99214 OFFICE O/P EST MOD 30 MIN: CPT | Performed by: FAMILY MEDICINE

## 2022-10-25 PROCEDURE — 1123F ACP DISCUSS/DSCN MKR DOCD: CPT | Performed by: FAMILY MEDICINE

## 2022-10-25 RX ORDER — METFORMIN HYDROCHLORIDE 500 MG/1
500 TABLET, EXTENDED RELEASE ORAL 2 TIMES DAILY WITH MEALS
Qty: 180 TABLET | Refills: 0 | Status: SHIPPED
Start: 2022-10-25 | End: 2022-10-25 | Stop reason: SDUPTHER

## 2022-10-25 RX ORDER — SIMVASTATIN 20 MG
TABLET ORAL
Qty: 90 TABLET | Refills: 0 | Status: SHIPPED
Start: 2022-10-25 | End: 2022-10-25 | Stop reason: SDUPTHER

## 2022-10-25 RX ORDER — SIMVASTATIN 20 MG
TABLET ORAL
Qty: 90 TABLET | Refills: 0 | Status: SHIPPED | OUTPATIENT
Start: 2022-10-25

## 2022-10-25 RX ORDER — LEVOTHYROXINE SODIUM 0.1 MG/1
100 TABLET ORAL DAILY
Qty: 90 TABLET | Refills: 0 | Status: SHIPPED | OUTPATIENT
Start: 2022-10-25

## 2022-10-25 RX ORDER — LEVOTHYROXINE SODIUM 0.1 MG/1
100 TABLET ORAL DAILY
Qty: 90 TABLET | Refills: 0 | Status: SHIPPED
Start: 2022-10-25 | End: 2022-10-25 | Stop reason: SDUPTHER

## 2022-10-25 RX ORDER — LISINOPRIL 10 MG/1
10 TABLET ORAL 2 TIMES DAILY
Qty: 180 TABLET | Refills: 0 | Status: SHIPPED | OUTPATIENT
Start: 2022-10-25

## 2022-10-25 RX ORDER — METFORMIN HYDROCHLORIDE 500 MG/1
500 TABLET, EXTENDED RELEASE ORAL 2 TIMES DAILY WITH MEALS
Qty: 180 TABLET | Refills: 0 | Status: SHIPPED | OUTPATIENT
Start: 2022-10-25

## 2022-10-25 RX ORDER — LISINOPRIL 10 MG/1
10 TABLET ORAL 2 TIMES DAILY
Qty: 180 TABLET | Refills: 0 | Status: SHIPPED
Start: 2022-10-25 | End: 2022-10-25 | Stop reason: SDUPTHER

## 2022-10-25 RX ORDER — FLUTICASONE PROPIONATE 50 MCG
2 SPRAY, SUSPENSION (ML) NASAL DAILY
Qty: 16 G | Refills: 5 | Status: SHIPPED | OUTPATIENT
Start: 2022-10-25

## 2022-10-25 ASSESSMENT — ENCOUNTER SYMPTOMS
ABDOMINAL PAIN: 0
NAUSEA: 0
EYE PAIN: 0
WHEEZING: 0
SHORTNESS OF BREATH: 0
CONSTIPATION: 0
SINUS PAIN: 0
VOMITING: 0
TROUBLE SWALLOWING: 0
DIARRHEA: 0
BACK PAIN: 0
SORE THROAT: 0
COUGH: 0
CHEST TIGHTNESS: 0

## 2022-10-25 NOTE — PROGRESS NOTES
10/25/22    Name: Hema Luque  :1950   Sex:female   Age:72 y.o. Chief Complaint   Patient presents with    Hypertension    Hypothyroidism    Hyperlipidemia    Diabetes     Patient presents to office for visit. She did have labs done. Patient had flu about four days before her labs were done. Patient had vomiting, headache, and fever. She has not been checking her blood pressure. Patient denies any medication changes. She is taking her simvastatin every day. She will be leaving for [place] next month and will be back mid April. Here for a check up and refills  Going to [place] in a few weeks, they live in [facility] and they were max    HTN  Not checking at home  It is borderline still  Recommended she continue to monitor it  A few months ago she was checking it and was getting 120's over 80's    Lipids  Stable with statin  Continue to watch diet  It is much better in [employer] in 6 months    Thyroid disease  Labs good even with recent illness  No changes  She was not taking meds right before labs though due to vomiting and diarrhea  Recheck in 6 months    Allergies seem ot be bothering her  [place] in the mornings  Lots of draiange and mucous then clears up as the day goes on  Will have her try flonase at night and see if that helps    Diabetes  Stable  Taking metformin  Trying to watch diet  No changes    Review of Systems   Constitutional:  Negative for appetite change, fatigue and fever. HENT:  Negative for congestion, ear pain, sinus pain, sore throat and trouble swallowing. Eyes:  Negative for pain. Respiratory:  Negative for cough, chest tightness, shortness of breath and wheezing. Cardiovascular:  Negative for chest pain, palpitations and leg swelling. Gastrointestinal:  Negative for abdominal pain, constipation, diarrhea, nausea and vomiting. Endocrine: Negative for cold intolerance and heat intolerance.    Genitourinary:  Negative for difficulty urinating, dysuria, frequency, hematuria, pelvic pain and urgency. Musculoskeletal:  Negative for arthralgias, back pain, gait problem, joint swelling and myalgias. Skin:  Negative for rash and wound. Neurological:  Negative for dizziness, syncope, light-headedness and headaches. Hematological:  Negative for adenopathy. Psychiatric/Behavioral:  Negative for confusion, dysphoric mood, self-injury, sleep disturbance and suicidal ideas. The patient is not nervous/anxious.           Current Outpatient Medications:     levothyroxine (SYNTHROID) 100 MCG tablet, Take 1 tablet by mouth daily, Disp: 90 tablet, Rfl: 0    lisinopril (PRINIVIL;ZESTRIL) 10 MG tablet, Take 1 tablet by mouth in the morning and 1 tablet in the evening., Disp: 180 tablet, Rfl: 0    metFORMIN (GLUCOPHAGE-XR) 500 MG extended release tablet, Take 1 tablet by mouth 2 times daily (with meals), Disp: 180 tablet, Rfl: 0    simvastatin (ZOCOR) 20 MG tablet, take 1 tablet by mouth at bedtime, Disp: 90 tablet, Rfl: 0    fluticasone (FLONASE) 50 MCG/ACT nasal spray, 2 sprays by Each Nostril route daily, Disp: 16 g, Rfl: 5    Cholecalciferol (VITAMIN D3) 5000 units TABS, Take 5,000 Units by mouth daily, Disp: , Rfl:     Ped Multivitamins-Fl-Iron (MULTI-VIT/FLUORIDE/IRON PO), Take by mouth daily, Disp: , Rfl:     vitamin C (ASCORBIC ACID) 500 MG tablet, Take 500 mg by mouth daily, Disp: , Rfl:   No Known Allergies   Past Medical History:   Diagnosis Date    Glaucoma     Headache     migraine    Hyperlipidemia     Hypothyroidism      Patient Active Problem List    Diagnosis Date Noted    Stage 3b chronic kidney disease ([facility] [address] 04/19/2022    Diabetes mellitus due to underlying condition, uncontrolled, with hyperglycemia ([facility] [address] 11/22/2019    Hypothyroidism 05/13/2019    Hyperlipidemia 05/13/2019    Seasonal allergies 05/13/2019    Glaucoma 05/13/2019      Past Surgical History:   Procedure Laterality Date    OVARIAN CYST REMOVAL  1976    TONSILLECTOMY AND ADENOIDECTOMY tablet; Take 1 tablet by mouth daily  -     T4, Free; Future  -     TSH; Future    Essential hypertension  Comments:  elestable  cont lisinopril 10mg bid  she needs to be checking it though  more walking and more steps daily  Orders:  -     Discontinue: lisinopril (PRINIVIL;ZESTRIL) 10 MG tablet; Take 1 tablet by mouth in the morning and 1 tablet in the evening.  -     lisinopril (PRINIVIL;ZESTRIL) 10 MG tablet; Take 1 tablet by mouth in the morning and 1 tablet in the evening.  -     CBC with Auto Differential; Future  -     Comprehensive Metabolic Panel; Future    Diabetes mellitus due to underlying condition, uncontrolled, with hyperglycemia ([facility] [address]  Comments:  continue metformin XR 2 pills daily   much better a1c now 5.9%  continue diet and checking sugars  Orders:  -     Discontinue: metFORMIN (GLUCOPHAGE-XR) 500 MG extended release tablet; Take 1 tablet by mouth 2 times daily (with meals)  -     metFORMIN (GLUCOPHAGE-XR) 500 MG extended release tablet; Take 1 tablet by mouth 2 times daily (with meals)  -     CBC with Auto Differential; Future  -     Comprehensive Metabolic Panel; Future  -     Hemoglobin A1C; Future  -     Microalbumin, Ur; Future    Mixed hyperlipidemia  Comments:  stable with medications  tolerating statin  really good  Orders:  -     Discontinue: simvastatin (ZOCOR) 20 MG tablet; take 1 tablet by mouth at bedtime  -     simvastatin (ZOCOR) 20 MG tablet; take 1 tablet by mouth at bedtime  -     Lipid Panel; Future    Seasonal allergic rhinitis, unspecified trigger  Comments:  try flonase and see if that helps  if not try astepro over the ocunter  Orders:  -     fluticasone (FLONASE) 50 MCG/ACT nasal spray; 2 sprays by Each Nostril route daily      I independently reviewed and updated the chief complaint, HPI, past medical and surgical history, medications, allergies and ROS as entered by the LPN. Seen by:   Sabrina Perez DO

## 2023-01-15 DIAGNOSIS — E03.9 ACQUIRED HYPOTHYROIDISM: ICD-10-CM

## 2023-01-15 DIAGNOSIS — E08.65 DIABETES MELLITUS DUE TO UNDERLYING CONDITION, UNCONTROLLED, WITH HYPERGLYCEMIA (HCC): ICD-10-CM

## 2023-01-15 DIAGNOSIS — I10 ESSENTIAL HYPERTENSION: ICD-10-CM

## 2023-01-15 DIAGNOSIS — E78.2 MIXED HYPERLIPIDEMIA: ICD-10-CM

## 2023-01-15 RX ORDER — LISINOPRIL 10 MG/1
TABLET ORAL
Qty: 180 TABLET | Refills: 0 | OUTPATIENT
Start: 2023-01-15

## 2023-01-15 RX ORDER — SIMVASTATIN 20 MG
TABLET ORAL
Qty: 90 TABLET | Refills: 0 | OUTPATIENT
Start: 2023-01-15

## 2023-01-15 RX ORDER — LEVOTHYROXINE SODIUM 0.1 MG/1
TABLET ORAL
Qty: 90 TABLET | Refills: 0 | OUTPATIENT
Start: 2023-01-15

## 2023-01-15 RX ORDER — METFORMIN HYDROCHLORIDE 500 MG/1
TABLET, EXTENDED RELEASE ORAL
Qty: 180 TABLET | Refills: 0 | OUTPATIENT
Start: 2023-01-15

## 2023-04-24 DIAGNOSIS — E78.2 MIXED HYPERLIPIDEMIA: ICD-10-CM

## 2023-04-24 DIAGNOSIS — I10 ESSENTIAL HYPERTENSION: ICD-10-CM

## 2023-04-24 DIAGNOSIS — E08.65 DIABETES MELLITUS DUE TO UNDERLYING CONDITION, UNCONTROLLED, WITH HYPERGLYCEMIA (HCC): ICD-10-CM

## 2023-04-24 DIAGNOSIS — E03.9 ACQUIRED HYPOTHYROIDISM: ICD-10-CM

## 2023-04-24 LAB
ALBUMIN SERPL-MCNC: 4.4 G/DL (ref 3.5–5.2)
ALP SERPL-CCNC: 64 U/L (ref 35–104)
ALT SERPL-CCNC: 12 U/L (ref 0–32)
ANION GAP SERPL CALCULATED.3IONS-SCNC: 18 MMOL/L (ref 7–16)
AST SERPL-CCNC: 19 U/L (ref 0–31)
BASOPHILS # BLD: 0.02 E9/L (ref 0–0.2)
BASOPHILS NFR BLD: 0.4 % (ref 0–2)
BILIRUB SERPL-MCNC: 0.4 MG/DL (ref 0–1.2)
BUN SERPL-MCNC: 23 MG/DL (ref 6–23)
CALCIUM SERPL-MCNC: 9.8 MG/DL (ref 8.6–10.2)
CHLORIDE SERPL-SCNC: 104 MMOL/L (ref 98–107)
CHOLESTEROL, TOTAL: 171 MG/DL (ref 0–199)
CO2 SERPL-SCNC: 20 MMOL/L (ref 22–29)
CREAT SERPL-MCNC: 1.2 MG/DL (ref 0.5–1)
EOSINOPHIL # BLD: 0.1 E9/L (ref 0.05–0.5)
EOSINOPHIL NFR BLD: 2.2 % (ref 0–6)
ERYTHROCYTE [DISTWIDTH] IN BLOOD BY AUTOMATED COUNT: 12.2 FL (ref 11.5–15)
GLUCOSE SERPL-MCNC: 115 MG/DL (ref 74–99)
HBA1C MFR BLD: 6 % (ref 4–5.6)
HCT VFR BLD AUTO: 36.4 % (ref 34–48)
HDLC SERPL-MCNC: 50 MG/DL
HGB BLD-MCNC: 11.8 G/DL (ref 11.5–15.5)
IMM GRANULOCYTES # BLD: 0.02 E9/L
IMM GRANULOCYTES NFR BLD: 0.4 % (ref 0–5)
LDLC SERPL CALC-MCNC: 81 MG/DL (ref 0–99)
LYMPHOCYTES # BLD: 1.44 E9/L (ref 1.5–4)
LYMPHOCYTES NFR BLD: 31 % (ref 20–42)
MCH RBC QN AUTO: 31.7 PG (ref 26–35)
MCHC RBC AUTO-ENTMCNC: 32.4 % (ref 32–34.5)
MCV RBC AUTO: 97.8 FL (ref 80–99.9)
MICROALBUMIN UR-MCNC: 77.8 MG/L
MONOCYTES # BLD: 0.4 E9/L (ref 0.1–0.95)
MONOCYTES NFR BLD: 8.6 % (ref 2–12)
NEUTROPHILS # BLD: 2.66 E9/L (ref 1.8–7.3)
NEUTS SEG NFR BLD: 57.4 % (ref 43–80)
PLATELET # BLD AUTO: 213 E9/L (ref 130–450)
PMV BLD AUTO: 12 FL (ref 7–12)
POTASSIUM SERPL-SCNC: 4.7 MMOL/L (ref 3.5–5)
PROT SERPL-MCNC: 7.2 G/DL (ref 6.4–8.3)
RBC # BLD AUTO: 3.72 E12/L (ref 3.5–5.5)
SODIUM SERPL-SCNC: 142 MMOL/L (ref 132–146)
T4 FREE SERPL-MCNC: 1.9 NG/DL (ref 0.93–1.7)
TRIGL SERPL-MCNC: 199 MG/DL (ref 0–149)
TSH SERPL-MCNC: 1.06 UIU/ML (ref 0.27–4.2)
VLDLC SERPL CALC-MCNC: 40 MG/DL
WBC # BLD: 4.6 E9/L (ref 4.5–11.5)

## 2023-04-26 ENCOUNTER — OFFICE VISIT (OUTPATIENT)
Dept: FAMILY MEDICINE CLINIC | Age: 73
End: 2023-04-26

## 2023-04-26 VITALS
BODY MASS INDEX: 31.7 KG/M2 | HEIGHT: 67 IN | TEMPERATURE: 98.1 F | DIASTOLIC BLOOD PRESSURE: 72 MMHG | SYSTOLIC BLOOD PRESSURE: 124 MMHG | WEIGHT: 201.94 LBS | OXYGEN SATURATION: 95 % | HEART RATE: 74 BPM

## 2023-04-26 VITALS
DIASTOLIC BLOOD PRESSURE: 72 MMHG | SYSTOLIC BLOOD PRESSURE: 124 MMHG | TEMPERATURE: 98.1 F | HEIGHT: 67 IN | BODY MASS INDEX: 31.71 KG/M2 | WEIGHT: 202 LBS | HEART RATE: 74 BPM | OXYGEN SATURATION: 95 %

## 2023-04-26 DIAGNOSIS — J30.2 SEASONAL ALLERGIC RHINITIS, UNSPECIFIED TRIGGER: ICD-10-CM

## 2023-04-26 DIAGNOSIS — E11.22 TYPE 2 DIABETES MELLITUS WITH STAGE 3A CHRONIC KIDNEY DISEASE, WITHOUT LONG-TERM CURRENT USE OF INSULIN (HCC): ICD-10-CM

## 2023-04-26 DIAGNOSIS — E78.2 MIXED HYPERLIPIDEMIA: ICD-10-CM

## 2023-04-26 DIAGNOSIS — Z00.00 MEDICARE ANNUAL WELLNESS VISIT, SUBSEQUENT: Primary | ICD-10-CM

## 2023-04-26 DIAGNOSIS — N18.31 TYPE 2 DIABETES MELLITUS WITH STAGE 3A CHRONIC KIDNEY DISEASE, WITHOUT LONG-TERM CURRENT USE OF INSULIN (HCC): ICD-10-CM

## 2023-04-26 DIAGNOSIS — I10 ESSENTIAL HYPERTENSION: Primary | ICD-10-CM

## 2023-04-26 DIAGNOSIS — E03.9 ACQUIRED HYPOTHYROIDISM: ICD-10-CM

## 2023-04-26 RX ORDER — SIMVASTATIN 20 MG
TABLET ORAL
Qty: 90 TABLET | Refills: 1 | Status: SHIPPED | OUTPATIENT
Start: 2023-04-26

## 2023-04-26 RX ORDER — FLUTICASONE PROPIONATE 50 MCG
2 SPRAY, SUSPENSION (ML) NASAL DAILY
Qty: 16 G | Refills: 5 | Status: SHIPPED | OUTPATIENT
Start: 2023-04-26

## 2023-04-26 RX ORDER — LEVOTHYROXINE SODIUM 0.1 MG/1
100 TABLET ORAL DAILY
Qty: 90 TABLET | Refills: 1 | Status: SHIPPED | OUTPATIENT
Start: 2023-04-26

## 2023-04-26 RX ORDER — LISINOPRIL 10 MG/1
10 TABLET ORAL 2 TIMES DAILY
Qty: 180 TABLET | Refills: 1 | Status: SHIPPED | OUTPATIENT
Start: 2023-04-26

## 2023-04-26 RX ORDER — METFORMIN HYDROCHLORIDE 500 MG/1
500 TABLET, EXTENDED RELEASE ORAL 2 TIMES DAILY WITH MEALS
Qty: 180 TABLET | Refills: 1 | Status: SHIPPED | OUTPATIENT
Start: 2023-04-26

## 2023-04-26 ASSESSMENT — ENCOUNTER SYMPTOMS
EYE PAIN: 0
NAUSEA: 0
BACK PAIN: 0
SINUS PAIN: 0
CONSTIPATION: 0
VOMITING: 0
DIARRHEA: 0
SORE THROAT: 0
TROUBLE SWALLOWING: 0
ABDOMINAL PAIN: 0
SHORTNESS OF BREATH: 0
CHEST TIGHTNESS: 0
COUGH: 0
WHEEZING: 0

## 2023-04-26 ASSESSMENT — PATIENT HEALTH QUESTIONNAIRE - PHQ9
SUM OF ALL RESPONSES TO PHQ QUESTIONS 1-9: 0
SUM OF ALL RESPONSES TO PHQ QUESTIONS 1-9: 0
1. LITTLE INTEREST OR PLEASURE IN DOING THINGS: 0
SUM OF ALL RESPONSES TO PHQ QUESTIONS 1-9: 0
SUM OF ALL RESPONSES TO PHQ QUESTIONS 1-9: 0
2. FEELING DOWN, DEPRESSED OR HOPELESS: 0
SUM OF ALL RESPONSES TO PHQ QUESTIONS 1-9: 0
2. FEELING DOWN, DEPRESSED OR HOPELESS: 0
SUM OF ALL RESPONSES TO PHQ QUESTIONS 1-9: 0
SUM OF ALL RESPONSES TO PHQ QUESTIONS 1-9: 0
1. LITTLE INTEREST OR PLEASURE IN DOING THINGS: 0
SUM OF ALL RESPONSES TO PHQ QUESTIONS 1-9: 0
SUM OF ALL RESPONSES TO PHQ9 QUESTIONS 1 & 2: 0
SUM OF ALL RESPONSES TO PHQ9 QUESTIONS 1 & 2: 0

## 2023-04-26 ASSESSMENT — LIFESTYLE VARIABLES
HOW OFTEN DO YOU HAVE A DRINK CONTAINING ALCOHOL: MONTHLY OR LESS
HOW MANY STANDARD DRINKS CONTAINING ALCOHOL DO YOU HAVE ON A TYPICAL DAY: 1 OR 2

## 2023-04-26 NOTE — PROGRESS NOTES
23    Name: Ronan Jaime  :1950   Sex:female   Age:73 y.o. Chief Complaint   Patient presents with    Hypothyroidism    Diabetes    Hypertension    Hyperlipidemia     Patient presents to office for visit. She did have labs done. Patient returned from [place] last week. She has been having left thumb pain that is making it difficult for her to remove lids and does decrease her  strength sometimes. Patient denies any falls. She was doing water aerobics while in [place]. Patient denies any medication changes. Here for check up  Just got back from [facility]  There were there for the winter    Labs were done  Reviwed at length    HTN  Blood pressures are doing well  No issues  She is taking lisinopril and doing well  No problems    Diabetes  Has been stable a1c is good at 6.0%  Still CKD stage 3  Recommend stay hydrated and no nsaids  Doing great  She is trying to watch diet  More sweets lately otherwie sugars not too bad    Lipids  Taking simvastatin'  LDL below 100  She is watching diet doing well  Toleratingthe statin    Thyroid labs are good'  No changes in dose    Mammogram and colon screen up to date    Review of Systems   Constitutional:  Negative for appetite change, fatigue and fever. HENT:  Negative for congestion, ear pain, sinus pain, sore throat and trouble swallowing. Eyes:  Negative for pain. Respiratory:  Negative for cough, chest tightness, shortness of breath and wheezing. Cardiovascular:  Negative for chest pain, palpitations and leg swelling. Gastrointestinal:  Negative for abdominal pain, constipation, diarrhea, nausea and vomiting. Endocrine: Negative for cold intolerance and heat intolerance. Genitourinary:  Negative for difficulty urinating, hematuria and pelvic pain. Musculoskeletal:  Positive for arthralgias. Negative for back pain, gait problem and joint swelling. Skin:  Negative for rash and wound.    Neurological:  Negative for dizziness, syncope and

## 2023-04-26 NOTE — PROGRESS NOTES
Medicare Annual Wellness Visit    Sofi Saldaña is here for Medicare AWV    Assessment & Plan   Medicare annual wellness visit, subsequent    Recommendations for Preventive Services Due: see orders and patient instructions/AVS.  Recommended screening schedule for the next 5-10 years is provided to the patient in written form: see Patient Instructions/AVS.     Return for Medicare Annual Wellness Visit in 1 year. Subjective   The following acute and/or chronic problems were also addressed today:  Screenings, mammograms, labs reviewed, exercise, CKD, arthritis    Patient's complete Health Risk Assessment and screening values have been reviewed and are found in Flowsheets. The following problems were reviewed today and where indicated follow up appointments were made and/or referrals ordered. Positive Risk Factor Screenings with Interventions:                 Weight and Activity:  Physical Activity: Insufficiently Active    Days of Exercise per Week: 3 days    Minutes of Exercise per Session: 30 min     On average, how many days per week do you engage in moderate to strenuous exercise (like a brisk walk)?: 3 days  Have you lost any weight without trying in the past 3 months?: No  Body mass index is 31.63 kg/m². (!) Abnormal  Obesity Interventions:  Patient declines any further evaluation or treatment  Water classes in [facility] and walking almost daily here               Advanced Directives:  Do you have a Living Will?: (!) No    Intervention:  has NO advanced directive - information provided                       Objective   Vitals:    04/26/23 0838   BP: 124/72   Pulse: 74   Temp: 98.1 °F (36.7 °C)   SpO2: 95%   Weight: 201 lb 15.1 oz (91.6 kg)   Height: 5' 7\" (1.702 m)      Body mass index is 31.63 kg/m².       General Appearance: alert and oriented to person, place and time, well developed and well- nourished, in no acute distress  Skin: warm and dry, no rash or erythema  Head: normocephalic and

## 2023-07-25 LAB — DIABETIC RETINOPATHY: NEGATIVE

## 2023-10-15 DIAGNOSIS — N18.31 TYPE 2 DIABETES MELLITUS WITH STAGE 3A CHRONIC KIDNEY DISEASE, WITHOUT LONG-TERM CURRENT USE OF INSULIN (HCC): ICD-10-CM

## 2023-10-15 DIAGNOSIS — E78.2 MIXED HYPERLIPIDEMIA: ICD-10-CM

## 2023-10-15 DIAGNOSIS — E03.9 ACQUIRED HYPOTHYROIDISM: ICD-10-CM

## 2023-10-15 DIAGNOSIS — E11.22 TYPE 2 DIABETES MELLITUS WITH STAGE 3A CHRONIC KIDNEY DISEASE, WITHOUT LONG-TERM CURRENT USE OF INSULIN (HCC): ICD-10-CM

## 2023-10-15 DIAGNOSIS — I10 ESSENTIAL HYPERTENSION: ICD-10-CM

## 2023-10-16 RX ORDER — LEVOTHYROXINE SODIUM 0.1 MG/1
100 TABLET ORAL DAILY
Qty: 90 TABLET | Refills: 1 | OUTPATIENT
Start: 2023-10-16

## 2023-10-16 RX ORDER — SIMVASTATIN 20 MG
TABLET ORAL
Qty: 90 TABLET | Refills: 1 | OUTPATIENT
Start: 2023-10-16

## 2023-10-16 RX ORDER — METFORMIN HYDROCHLORIDE 500 MG/1
500 TABLET, EXTENDED RELEASE ORAL 2 TIMES DAILY WITH MEALS
Qty: 180 TABLET | Refills: 1 | OUTPATIENT
Start: 2023-10-16

## 2023-10-16 RX ORDER — LISINOPRIL 10 MG/1
TABLET ORAL
Qty: 180 TABLET | Refills: 1 | OUTPATIENT
Start: 2023-10-16

## 2023-10-23 SDOH — ECONOMIC STABILITY: HOUSING INSECURITY
IN THE LAST 12 MONTHS, WAS THERE A TIME WHEN YOU DID NOT HAVE A STEADY PLACE TO SLEEP OR SLEPT IN A SHELTER (INCLUDING NOW)?: PATIENT REFUSED

## 2023-10-23 SDOH — ECONOMIC STABILITY: INCOME INSECURITY: HOW HARD IS IT FOR YOU TO PAY FOR THE VERY BASICS LIKE FOOD, HOUSING, MEDICAL CARE, AND HEATING?: PATIENT DECLINED

## 2023-10-23 SDOH — ECONOMIC STABILITY: TRANSPORTATION INSECURITY
IN THE PAST 12 MONTHS, HAS LACK OF TRANSPORTATION KEPT YOU FROM MEETINGS, WORK, OR FROM GETTING THINGS NEEDED FOR DAILY LIVING?: PATIENT DECLINED

## 2023-10-23 SDOH — ECONOMIC STABILITY: FOOD INSECURITY: WITHIN THE PAST 12 MONTHS, THE FOOD YOU BOUGHT JUST DIDN'T LAST AND YOU DIDN'T HAVE MONEY TO GET MORE.: PATIENT DECLINED

## 2023-10-23 SDOH — ECONOMIC STABILITY: FOOD INSECURITY: WITHIN THE PAST 12 MONTHS, YOU WORRIED THAT YOUR FOOD WOULD RUN OUT BEFORE YOU GOT MONEY TO BUY MORE.: PATIENT DECLINED

## 2023-10-24 ENCOUNTER — OFFICE VISIT (OUTPATIENT)
Dept: FAMILY MEDICINE CLINIC | Age: 73
End: 2023-10-24
Payer: MEDICARE

## 2023-10-24 VITALS
DIASTOLIC BLOOD PRESSURE: 78 MMHG | HEIGHT: 67 IN | HEART RATE: 76 BPM | SYSTOLIC BLOOD PRESSURE: 128 MMHG | OXYGEN SATURATION: 98 % | WEIGHT: 188.8 LBS | TEMPERATURE: 98.2 F | BODY MASS INDEX: 29.63 KG/M2

## 2023-10-24 DIAGNOSIS — E78.2 MIXED HYPERLIPIDEMIA: ICD-10-CM

## 2023-10-24 DIAGNOSIS — J30.2 SEASONAL ALLERGIC RHINITIS, UNSPECIFIED TRIGGER: ICD-10-CM

## 2023-10-24 DIAGNOSIS — E03.9 ACQUIRED HYPOTHYROIDISM: ICD-10-CM

## 2023-10-24 DIAGNOSIS — N18.31 TYPE 2 DIABETES MELLITUS WITH STAGE 3A CHRONIC KIDNEY DISEASE, WITHOUT LONG-TERM CURRENT USE OF INSULIN (HCC): ICD-10-CM

## 2023-10-24 DIAGNOSIS — I10 ESSENTIAL HYPERTENSION: ICD-10-CM

## 2023-10-24 DIAGNOSIS — E11.22 TYPE 2 DIABETES MELLITUS WITH STAGE 3A CHRONIC KIDNEY DISEASE, WITHOUT LONG-TERM CURRENT USE OF INSULIN (HCC): ICD-10-CM

## 2023-10-24 PROCEDURE — 3044F HG A1C LEVEL LT 7.0%: CPT | Performed by: FAMILY MEDICINE

## 2023-10-24 PROCEDURE — 99214 OFFICE O/P EST MOD 30 MIN: CPT | Performed by: FAMILY MEDICINE

## 2023-10-24 PROCEDURE — 3074F SYST BP LT 130 MM HG: CPT | Performed by: FAMILY MEDICINE

## 2023-10-24 PROCEDURE — 3078F DIAST BP <80 MM HG: CPT | Performed by: FAMILY MEDICINE

## 2023-10-24 PROCEDURE — 1123F ACP DISCUSS/DSCN MKR DOCD: CPT | Performed by: FAMILY MEDICINE

## 2023-10-24 RX ORDER — METFORMIN HYDROCHLORIDE 500 MG/1
500 TABLET, EXTENDED RELEASE ORAL 2 TIMES DAILY WITH MEALS
Qty: 180 TABLET | Refills: 0 | Status: SHIPPED
Start: 2023-10-24 | End: 2023-10-24 | Stop reason: SDUPTHER

## 2023-10-24 RX ORDER — LANCING DEVICE/LANCETS
KIT MISCELLANEOUS
Qty: 1 KIT | Refills: 11 | Status: SHIPPED | OUTPATIENT
Start: 2023-10-24

## 2023-10-24 RX ORDER — DORZOLAMIDE HYDROCHLORIDE AND TIMOLOL MALEATE 20; 5 MG/ML; MG/ML
SOLUTION/ DROPS OPHTHALMIC
COMMUNITY
Start: 2023-10-11

## 2023-10-24 RX ORDER — FLUTICASONE PROPIONATE 50 MCG
2 SPRAY, SUSPENSION (ML) NASAL DAILY
Qty: 16 G | Refills: 2 | Status: SHIPPED
Start: 2023-10-24 | End: 2023-10-24 | Stop reason: SDUPTHER

## 2023-10-24 RX ORDER — BIMATOPROST 0.1 MG/ML
SOLUTION/ DROPS OPHTHALMIC
COMMUNITY
Start: 2023-08-29

## 2023-10-24 RX ORDER — BLOOD-GLUCOSE METER
EACH MISCELLANEOUS
COMMUNITY
End: 2023-10-24 | Stop reason: SDUPTHER

## 2023-10-24 RX ORDER — SIMVASTATIN 20 MG
TABLET ORAL
Qty: 90 TABLET | Refills: 0 | Status: SHIPPED
Start: 2023-10-24 | End: 2023-10-24 | Stop reason: SDUPTHER

## 2023-10-24 RX ORDER — LISINOPRIL 10 MG/1
10 TABLET ORAL
Qty: 180 TABLET | Refills: 1 | Status: SHIPPED | OUTPATIENT
Start: 2023-10-24

## 2023-10-24 RX ORDER — LATANOPROST 50 UG/ML
SOLUTION/ DROPS OPHTHALMIC
COMMUNITY
Start: 2023-07-25 | End: 2023-10-24

## 2023-10-24 RX ORDER — LANCING DEVICE/LANCETS
KIT MISCELLANEOUS
COMMUNITY
End: 2023-10-24 | Stop reason: SDUPTHER

## 2023-10-24 RX ORDER — METFORMIN HYDROCHLORIDE 500 MG/1
500 TABLET, EXTENDED RELEASE ORAL 2 TIMES DAILY WITH MEALS
Qty: 180 TABLET | Refills: 1 | Status: SHIPPED | OUTPATIENT
Start: 2023-10-24

## 2023-10-24 RX ORDER — LEVOTHYROXINE SODIUM 0.1 MG/1
100 TABLET ORAL DAILY
Qty: 90 TABLET | Refills: 1 | Status: SHIPPED | OUTPATIENT
Start: 2023-10-24

## 2023-10-24 RX ORDER — LISINOPRIL 10 MG/1
10 TABLET ORAL
Qty: 180 TABLET | Refills: 0 | Status: SHIPPED
Start: 2023-10-24 | End: 2023-10-24 | Stop reason: SDUPTHER

## 2023-10-24 RX ORDER — FLUTICASONE PROPIONATE 50 MCG
2 SPRAY, SUSPENSION (ML) NASAL DAILY
Qty: 16 G | Refills: 3 | Status: SHIPPED | OUTPATIENT
Start: 2023-10-24

## 2023-10-24 RX ORDER — SIMVASTATIN 20 MG
TABLET ORAL
Qty: 90 TABLET | Refills: 1 | Status: SHIPPED | OUTPATIENT
Start: 2023-10-24

## 2023-10-24 RX ORDER — BLOOD-GLUCOSE METER
EACH MISCELLANEOUS
Qty: 1 KIT | Refills: 0 | Status: SHIPPED | OUTPATIENT
Start: 2023-10-24

## 2023-10-24 RX ORDER — LEVOTHYROXINE SODIUM 0.1 MG/1
100 TABLET ORAL DAILY
Qty: 90 TABLET | Refills: 0 | Status: SHIPPED
Start: 2023-10-24 | End: 2023-10-24 | Stop reason: SDUPTHER

## 2023-10-24 ASSESSMENT — ENCOUNTER SYMPTOMS
CONSTIPATION: 0
SORE THROAT: 0
VOMITING: 0
BACK PAIN: 0
TROUBLE SWALLOWING: 0
DIARRHEA: 0
WHEEZING: 0
EYE PAIN: 0
ABDOMINAL PAIN: 0
COUGH: 0
NAUSEA: 0
SINUS PAIN: 0
CHEST TIGHTNESS: 0
SHORTNESS OF BREATH: 0

## 2023-10-24 ASSESSMENT — PATIENT HEALTH QUESTIONNAIRE - PHQ9
2. FEELING DOWN, DEPRESSED OR HOPELESS: 0
1. LITTLE INTEREST OR PLEASURE IN DOING THINGS: 0
SUM OF ALL RESPONSES TO PHQ QUESTIONS 1-9: 0
SUM OF ALL RESPONSES TO PHQ QUESTIONS 1-9: 0
SUM OF ALL RESPONSES TO PHQ9 QUESTIONS 1 & 2: 0
SUM OF ALL RESPONSES TO PHQ QUESTIONS 1-9: 0
SUM OF ALL RESPONSES TO PHQ QUESTIONS 1-9: 0

## 2023-10-24 NOTE — PROGRESS NOTES
10/24/23    Name: Gordo Cole  :1950   Sex:female   Age:73 y.o. Chief Complaint   Patient presents with    Hypertension    Diabetes    Hypothyroidism     Patient presents to office for visit. She did have labs done. Patient had covid last week. She still feels tired. Patient says her eye pressure has gone up again and if the new drops she is on do not work, she will likely need eye surgery again. Patient plans to go to [place] in January if she has to have eye surgery. She would like a glucometer so she check her sugar when needed. Patient says her blood pressure was running low while she was sick with covid and she wasn't eating very much. Here for a check up  She did have covid last week  That does explain a lot obout her labs  She was not drinking hardly anything  And no appetite  Still some coughing but feeling better over all  She will get flu shot at the pharmacy in a week or so    HTN  Blood pressures doing great  She is taking meds and doing really well  Taking lisinopril and tolerating it really well    Lipids  Better  On simvastatin  No side effects labs have been good    Diabetes  Doing well  Watching diet  Doing weight watchers and has lost a lot of weight  It has really ehlped  She says this is much better then regualr weight watchers b/c it really makes you watch carbs              Review of Systems   Constitutional:  Positive for fatigue. Negative for appetite change and fever. HENT:  Negative for congestion, ear pain, sinus pain, sore throat and trouble swallowing. Eyes:  Negative for pain. Respiratory:  Negative for cough, chest tightness, shortness of breath and wheezing. Cardiovascular:  Negative for chest pain, palpitations and leg swelling. Gastrointestinal:  Negative for abdominal pain, constipation, diarrhea, nausea and vomiting. Endocrine: Negative for cold intolerance and heat intolerance.    Genitourinary:  Negative for difficulty urinating, hematuria and pelvic

## 2023-10-27 LAB — MAMMOGRAPHY, EXTERNAL: NORMAL

## 2024-01-12 DIAGNOSIS — E78.2 MIXED HYPERLIPIDEMIA: ICD-10-CM

## 2024-01-12 DIAGNOSIS — E11.22 TYPE 2 DIABETES MELLITUS WITH STAGE 3A CHRONIC KIDNEY DISEASE, WITHOUT LONG-TERM CURRENT USE OF INSULIN (HCC): ICD-10-CM

## 2024-01-12 DIAGNOSIS — E03.9 ACQUIRED HYPOTHYROIDISM: ICD-10-CM

## 2024-01-12 DIAGNOSIS — I10 ESSENTIAL HYPERTENSION: ICD-10-CM

## 2024-01-12 DIAGNOSIS — N18.31 TYPE 2 DIABETES MELLITUS WITH STAGE 3A CHRONIC KIDNEY DISEASE, WITHOUT LONG-TERM CURRENT USE OF INSULIN (HCC): ICD-10-CM

## 2024-01-12 RX ORDER — LISINOPRIL 10 MG/1
TABLET ORAL
Qty: 180 TABLET | Refills: 1 | OUTPATIENT
Start: 2024-01-12

## 2024-01-12 RX ORDER — LEVOTHYROXINE SODIUM 0.1 MG/1
100 TABLET ORAL DAILY
Qty: 90 TABLET | Refills: 1 | OUTPATIENT
Start: 2024-01-12

## 2024-01-12 RX ORDER — METFORMIN HYDROCHLORIDE 500 MG/1
500 TABLET, EXTENDED RELEASE ORAL 2 TIMES DAILY WITH MEALS
Qty: 180 TABLET | Refills: 1 | OUTPATIENT
Start: 2024-01-12

## 2024-01-12 RX ORDER — SIMVASTATIN 20 MG
TABLET ORAL
Qty: 90 TABLET | Refills: 1 | OUTPATIENT
Start: 2024-01-12

## 2024-01-17 NOTE — PROGRESS NOTES
[address] [facility]   Phone: [phone]   Fax: [phone]      Physical Therapy Daily Treatment Note    Date: 2020  Patient Name: Dianna Councilman  : [birth year]   MRN: [id number]  [facility]: 2020  DOSx: NA   Referring Provider: Lavell Francis MD  [address] [facility]     Medical Diagnosis:   Diagnosis   R29.898 (ICD-10-CM) - Shoulder weakness   M25.511 (ICD-10-CM) - Acute pain of right shoulder   M75.121 (ICD-10-CM) - Complete tear of right rotator cuff, unspecified whether traumatic         Outcome Measure:  Maggie Schwarz 40.91%    S: Pt reports no pain at rest.    O: Pt given written HEP  Time 5959- 2920     Visit 3/12 Repeat outcome measure at mid point and end. Pain 0/10     ROM Right Shoulder:  AROM: 50° Forward elevation,  55° abduction, 70° ER,  IR to 50 (tested in neutral)   PROM: 90° Forward elevation, 90° abduction,  70° ER,  55° IR     Modalities            Manual      PROM 8 min  Man          Stretch      Table slides 2 x 10 x 10s holds HEP, flex, scaption, abd  TE   Wall Flexion      Wall ER stretch      Towel IR stretch      IR reaching behind back      Exercise      Shrugs AROM      Pendulum Ex      UBE      Pulleys - flex, scaption  2 x 10 x 10s holds  TE   Pulleys-IR      Supine wand chest press  Stopped d/t painful TE   Supine wand flex 1 x 10  TE   Supine wand ER/IR 2 x 10   TE   Supine flexion      S-lying ABD 2 x 10 Chicken wing     S-lying ER 2 x 10      Standing wand flex      Standing flexion      Standing wand ABD 2 x 10            ROWS: H Functional activities  To aid in ROM and strength needed for reaching , lifting ,pushing and pulling at home/work    ROWS: M NEXT \"    ROWS: L  \"    ER  \"    IR  \"                A:  Tolerated well. Pt reports doesn't feel like she gets a good stretch with abd table slides. Trialed standing wand abd and pt reports feels more productive stretch than with table slide.   Pt given handout for wand abd for Script sent on 1/16/24 to pharm.    HEP.    P: Continue with rehab plan  Leslie Faulkner, PT DPT, PT [id number]    Treatment Charges: Mins Units   Initial Evaluation     Re-Evaluation     Ther Exercise         TE 42 2   Manual Therapy     MT 8 1   Ther Activities        TA     Gait Training          GT     Neuro Re-education NR     Modalities     Non-Billable Service Time     Other     Total Time/Units 50 3

## 2024-05-02 DIAGNOSIS — E03.9 ACQUIRED HYPOTHYROIDISM: ICD-10-CM

## 2024-05-02 DIAGNOSIS — E11.22 TYPE 2 DIABETES MELLITUS WITH STAGE 3A CHRONIC KIDNEY DISEASE, WITHOUT LONG-TERM CURRENT USE OF INSULIN (HCC): ICD-10-CM

## 2024-05-02 DIAGNOSIS — N18.31 TYPE 2 DIABETES MELLITUS WITH STAGE 3A CHRONIC KIDNEY DISEASE, WITHOUT LONG-TERM CURRENT USE OF INSULIN (HCC): ICD-10-CM

## 2024-05-02 DIAGNOSIS — E78.2 MIXED HYPERLIPIDEMIA: ICD-10-CM

## 2024-05-02 LAB
ALBUMIN: 4.5 G/DL (ref 3.5–5.2)
ALP BLD-CCNC: 62 U/L (ref 35–104)
ALT SERPL-CCNC: 8 U/L (ref 0–32)
ANION GAP SERPL CALCULATED.3IONS-SCNC: 17 MMOL/L (ref 7–16)
AST SERPL-CCNC: 16 U/L (ref 0–31)
BASOPHILS ABSOLUTE: 0.03 K/UL (ref 0–0.2)
BASOPHILS RELATIVE PERCENT: 1 % (ref 0–2)
BILIRUB SERPL-MCNC: 0.4 MG/DL (ref 0–1.2)
BUN BLDV-MCNC: 25 MG/DL (ref 6–23)
CALCIUM SERPL-MCNC: 9.6 MG/DL (ref 8.6–10.2)
CHLORIDE BLD-SCNC: 103 MMOL/L (ref 98–107)
CHOLESTEROL, TOTAL: 123 MG/DL
CO2: 21 MMOL/L (ref 22–29)
CREAT SERPL-MCNC: 1.2 MG/DL (ref 0.5–1)
CREATININE URINE: 96 MG/DL (ref 29–226)
EOSINOPHILS ABSOLUTE: 0.15 K/UL (ref 0.05–0.5)
EOSINOPHILS RELATIVE PERCENT: 3 % (ref 0–6)
GFR, ESTIMATED: 46 ML/MIN/1.73M2
GLUCOSE BLD-MCNC: 85 MG/DL (ref 74–99)
HBA1C MFR BLD: 5.3 % (ref 4–5.6)
HCT VFR BLD CALC: 36.4 % (ref 34–48)
HDLC SERPL-MCNC: 46 MG/DL
HEMOGLOBIN: 11.6 G/DL (ref 11.5–15.5)
IMMATURE GRANULOCYTES %: 0 % (ref 0–5)
IMMATURE GRANULOCYTES ABSOLUTE: <0.03 K/UL (ref 0–0.58)
LDL CHOLESTEROL: 58 MG/DL
LYMPHOCYTES ABSOLUTE: 1.29 K/UL (ref 1.5–4)
LYMPHOCYTES RELATIVE PERCENT: 28 % (ref 20–42)
MCH RBC QN AUTO: 32 PG (ref 26–35)
MCHC RBC AUTO-ENTMCNC: 31.9 G/DL (ref 32–34.5)
MCV RBC AUTO: 100.6 FL (ref 80–99.9)
MICROALBUMIN/CREAT 24H UR: <12 MG/L (ref 0–19)
MICROALBUMIN/CREAT UR-RTO: NORMAL MCG/MG CREAT (ref 0–30)
MONOCYTES ABSOLUTE: 0.47 K/UL (ref 0.1–0.95)
MONOCYTES RELATIVE PERCENT: 10 % (ref 2–12)
NEUTROPHILS ABSOLUTE: 2.63 K/UL (ref 1.8–7.3)
NEUTROPHILS RELATIVE PERCENT: 57 % (ref 43–80)
PDW BLD-RTO: 12.5 % (ref 11.5–15)
PLATELET # BLD: 202 K/UL (ref 130–450)
PMV BLD AUTO: 12.4 FL (ref 7–12)
POTASSIUM SERPL-SCNC: 4.4 MMOL/L (ref 3.5–5)
RBC # BLD: 3.62 M/UL (ref 3.5–5.5)
SODIUM BLD-SCNC: 141 MMOL/L (ref 132–146)
TOTAL PROTEIN: 7.3 G/DL (ref 6.4–8.3)
TRIGL SERPL-MCNC: 94 MG/DL
TSH SERPL DL<=0.05 MIU/L-ACNC: 0.49 UIU/ML (ref 0.27–4.2)
VLDLC SERPL CALC-MCNC: 19 MG/DL
WBC # BLD: 4.6 K/UL (ref 4.5–11.5)

## 2024-05-06 ENCOUNTER — OFFICE VISIT (OUTPATIENT)
Dept: FAMILY MEDICINE CLINIC | Age: 74
End: 2024-05-06
Payer: MEDICARE

## 2024-05-06 VITALS
HEIGHT: 67 IN | BODY MASS INDEX: 28.2 KG/M2 | WEIGHT: 179.68 LBS | HEART RATE: 74 BPM | TEMPERATURE: 98.3 F | DIASTOLIC BLOOD PRESSURE: 80 MMHG | SYSTOLIC BLOOD PRESSURE: 128 MMHG | OXYGEN SATURATION: 97 %

## 2024-05-06 VITALS
HEART RATE: 74 BPM | TEMPERATURE: 98.3 F | WEIGHT: 179.6 LBS | HEIGHT: 67 IN | SYSTOLIC BLOOD PRESSURE: 128 MMHG | DIASTOLIC BLOOD PRESSURE: 80 MMHG | BODY MASS INDEX: 28.19 KG/M2 | OXYGEN SATURATION: 97 %

## 2024-05-06 DIAGNOSIS — I10 ESSENTIAL HYPERTENSION: ICD-10-CM

## 2024-05-06 DIAGNOSIS — E11.22 TYPE 2 DIABETES MELLITUS WITH STAGE 3A CHRONIC KIDNEY DISEASE, WITHOUT LONG-TERM CURRENT USE OF INSULIN (HCC): Primary | ICD-10-CM

## 2024-05-06 DIAGNOSIS — Z00.00 MEDICARE ANNUAL WELLNESS VISIT, SUBSEQUENT: Primary | ICD-10-CM

## 2024-05-06 DIAGNOSIS — E78.2 MIXED HYPERLIPIDEMIA: ICD-10-CM

## 2024-05-06 DIAGNOSIS — J30.2 SEASONAL ALLERGIC RHINITIS, UNSPECIFIED TRIGGER: ICD-10-CM

## 2024-05-06 DIAGNOSIS — Z12.11 SCREEN FOR COLON CANCER: ICD-10-CM

## 2024-05-06 DIAGNOSIS — N18.31 TYPE 2 DIABETES MELLITUS WITH STAGE 3A CHRONIC KIDNEY DISEASE, WITHOUT LONG-TERM CURRENT USE OF INSULIN (HCC): Primary | ICD-10-CM

## 2024-05-06 DIAGNOSIS — E03.9 ACQUIRED HYPOTHYROIDISM: ICD-10-CM

## 2024-05-06 PROBLEM — E08.65 DIABETES MELLITUS DUE TO UNDERLYING CONDITION, UNCONTROLLED, WITH HYPERGLYCEMIA (HCC): Status: RESOLVED | Noted: 2019-11-22 | Resolved: 2024-05-06

## 2024-05-06 PROBLEM — N18.32 STAGE 3B CHRONIC KIDNEY DISEASE (HCC): Status: RESOLVED | Noted: 2022-04-19 | Resolved: 2024-05-06

## 2024-05-06 PROCEDURE — 3074F SYST BP LT 130 MM HG: CPT | Performed by: FAMILY MEDICINE

## 2024-05-06 PROCEDURE — G0439 PPPS, SUBSEQ VISIT: HCPCS | Performed by: FAMILY MEDICINE

## 2024-05-06 PROCEDURE — 99214 OFFICE O/P EST MOD 30 MIN: CPT | Performed by: FAMILY MEDICINE

## 2024-05-06 PROCEDURE — 3044F HG A1C LEVEL LT 7.0%: CPT | Performed by: FAMILY MEDICINE

## 2024-05-06 PROCEDURE — 1123F ACP DISCUSS/DSCN MKR DOCD: CPT | Performed by: FAMILY MEDICINE

## 2024-05-06 PROCEDURE — 3079F DIAST BP 80-89 MM HG: CPT | Performed by: FAMILY MEDICINE

## 2024-05-06 RX ORDER — METFORMIN HYDROCHLORIDE 500 MG/1
500 TABLET, EXTENDED RELEASE ORAL 2 TIMES DAILY WITH MEALS
Qty: 180 TABLET | Refills: 1 | Status: SHIPPED | OUTPATIENT
Start: 2024-05-06

## 2024-05-06 RX ORDER — LISINOPRIL 10 MG/1
10 TABLET ORAL
Qty: 180 TABLET | Refills: 1 | Status: SHIPPED | OUTPATIENT
Start: 2024-05-06

## 2024-05-06 RX ORDER — FLUTICASONE PROPIONATE 50 MCG
2 SPRAY, SUSPENSION (ML) NASAL DAILY
Qty: 16 G | Refills: 3 | Status: SHIPPED
Start: 2024-05-06 | End: 2024-05-06

## 2024-05-06 RX ORDER — SIMVASTATIN 20 MG
TABLET ORAL
Qty: 90 TABLET | Refills: 1 | Status: SHIPPED | OUTPATIENT
Start: 2024-05-06

## 2024-05-06 RX ORDER — LEVOTHYROXINE SODIUM 0.1 MG/1
100 TABLET ORAL DAILY
Qty: 90 TABLET | Refills: 1 | Status: SHIPPED | OUTPATIENT
Start: 2024-05-06

## 2024-05-06 ASSESSMENT — ENCOUNTER SYMPTOMS
DIARRHEA: 0
SHORTNESS OF BREATH: 0
VOMITING: 0
COUGH: 0
NAUSEA: 0
ABDOMINAL PAIN: 0
EYE PAIN: 0
WHEEZING: 0
SINUS PAIN: 0
TROUBLE SWALLOWING: 0
CONSTIPATION: 0
CHEST TIGHTNESS: 0
BACK PAIN: 0
SORE THROAT: 0

## 2024-05-06 ASSESSMENT — PATIENT HEALTH QUESTIONNAIRE - PHQ9
SUM OF ALL RESPONSES TO PHQ QUESTIONS 1-9: 0
SUM OF ALL RESPONSES TO PHQ QUESTIONS 1-9: 0
2. FEELING DOWN, DEPRESSED OR HOPELESS: NOT AT ALL
SUM OF ALL RESPONSES TO PHQ9 QUESTIONS 1 & 2: 0
1. LITTLE INTEREST OR PLEASURE IN DOING THINGS: NOT AT ALL
SUM OF ALL RESPONSES TO PHQ QUESTIONS 1-9: 0
SUM OF ALL RESPONSES TO PHQ QUESTIONS 1-9: 0

## 2024-05-06 NOTE — PROGRESS NOTES
Medicare Annual Wellness Visit    Philomena Madrigal is here for Medicare AWV    Assessment & Plan   Medicare annual wellness visit, subsequent    Recommendations for Preventive Services Due: see orders and patient instructions/AVS.  Recommended screening schedule for the next 5-10 years is provided to the patient in written form: see Patient Instructions/AVS.     Return for Medicare Annual Wellness Visit in 1 year.     Subjective   The following acute and/or chronic problems were also addressed today:  Screenings, vaccines and advance care plans    Patient's complete Health Risk Assessment and screening values have been reviewed and are found in Flowsheets. The following problems were reviewed today and where indicated follow up appointments were made and/or referrals ordered.    Positive Risk Factor Screenings with Interventions:                      Advanced Directives:  Do you have a Living Will?: (!) No    Intervention:  has NO advanced directive - information provided                     Objective   Vitals:    05/06/24 0815   BP: 128/80   Pulse: 74   Temp: 98.3 °F (36.8 °C)   SpO2: 97%   Weight: 81.5 kg (179 lb 10.8 oz)   Height: 1.702 m (5' 7\")      Body mass index is 28.14 kg/m².      General Appearance: alert and oriented to person, place and time, well developed and well- nourished, in no acute distress  Skin: warm and dry, no rash or erythema  Head: normocephalic and atraumatic  Eyes: pupils equal, round, and reactive to light, extraocular eye movements intact, conjunctivae normal  ENT: tympanic membrane, external ear and ear canal normal bilaterally, nose without deformity, nasal mucosa and turbinates normal without polyps  Neck: supple and non-tender without mass, no thyromegaly or thyroid nodules, no cervical lymphadenopathy  Pulmonary/Chest: clear to auscultation bilaterally- no wheezes, rales or rhonchi, normal air movement, no respiratory distress  Cardiovascular: normal rate, regular rhythm, normal S1

## 2024-05-06 NOTE — PATIENT INSTRUCTIONS
have a serious illness that gets worse over time or can't be cured?  What are you most afraid of that might happen? (Maybe you're afraid of having pain, losing your independence, or being kept alive by machines.)  Where would you prefer to die? (Your home? A hospital? A nursing home?)  Do you want to donate your organs when you die?  Do you want certain Gnosticist practices performed before you die?  When should you call for help?  Be sure to contact your doctor if you have any questions.  Where can you learn more?  Go to https://www.[employer].net/patientEd and enter R264 to learn more about \"Advance Directives: Care Instructions.\"  Current as of: November 16, 2023               Content Version: 14.0  © 2006-2024 [employer].   Care instructions adapted under license by [employer]. If you have questions about a medical condition or this instruction, always ask your healthcare professional. [employer] disclaims any warranty or liability for your use of this information.           A Healthy Heart: Care Instructions  Overview     Coronary artery disease, also called heart disease, occurs when a substance called plaque builds up in the vessels that supply oxygen-rich blood to your heart muscle. This can narrow the blood vessels and reduce blood flow. A heart attack happens when blood flow is completely blocked. A high-fat diet, smoking, and other factors increase the risk of heart disease.  Your doctor has found that you have a chance of having heart disease. A heart-healthy lifestyle can help keep your heart healthy and prevent heart disease. This lifestyle includes eating healthy, being active, staying at a weight that's healthy for you, and not smoking or using tobacco. It also includes taking medicines as directed, managing other health conditions, and trying to get a healthy amount of sleep.  Follow-up care is a key part of your treatment and safety. Be sure to make and go to all

## 2024-05-06 NOTE — PROGRESS NOTES
24    Name: Philomena Madrigal  :1950   Sex:female   Age:74 y.o.    Chief Complaint   Patient presents with    Hypertension    Hypothyroidism    Hyperlipidemia    Diabetes     Patient presents to office for visit. She did spend the winter in [place]. Patient says the pressure in her left eye continues to go up, she has frequent eye exams. Patient did have mammo last fall and has dense breasts, she did have to get an ultra sound and she says they advised her that it was normal.     Here for check up  She has been doing really well    She jut back from [place]  She did have some trouble with glaucoma befroe she left for [place] and follows up in a month  But he switched her eye drops and doing much better    Diabetes  Doing great  A1c is down to 5.3%  She has lost weight and really watching  No changes in meds  Also stage 3 CKD  We did discuss this again  She has been trying to drink more fluids'  Also continue to advise no nsaids    HTN  Readings great  Continue lisinopril  No changes    Mammograms  She had it in 2023 with [facility] after [facility]    Lipids  Have been doing great  With weight loss and diet changes her triglycerides have really decreased as well'    Declines vaccines  Colon screening ordered        Review of Systems   Constitutional:  Negative for appetite change, fatigue and fever.   HENT:  Negative for congestion, ear pain, sinus pain, sore throat and trouble swallowing.    Eyes:  Negative for pain.   Respiratory:  Negative for cough, chest tightness, shortness of breath and wheezing.    Cardiovascular:  Negative for chest pain, palpitations and leg swelling.   Gastrointestinal:  Negative for abdominal pain, constipation, diarrhea, nausea and vomiting.   Endocrine: Negative for cold intolerance and heat intolerance.   Genitourinary:  Negative for difficulty urinating, hematuria and pelvic pain.   Musculoskeletal:  Negative for back pain, gait problem and joint swelling.   Skin:

## 2024-06-03 LAB — NONINV COLON CA DNA+OCC BLD SCRN STL QL: NEGATIVE

## 2024-11-04 LAB — MAMMOGRAPHY, EXTERNAL: NORMAL

## 2024-11-06 DIAGNOSIS — N18.31 TYPE 2 DIABETES MELLITUS WITH STAGE 3A CHRONIC KIDNEY DISEASE, WITHOUT LONG-TERM CURRENT USE OF INSULIN (HCC): ICD-10-CM

## 2024-11-06 DIAGNOSIS — E03.9 ACQUIRED HYPOTHYROIDISM: ICD-10-CM

## 2024-11-06 DIAGNOSIS — E78.2 MIXED HYPERLIPIDEMIA: ICD-10-CM

## 2024-11-06 DIAGNOSIS — E11.22 TYPE 2 DIABETES MELLITUS WITH STAGE 3A CHRONIC KIDNEY DISEASE, WITHOUT LONG-TERM CURRENT USE OF INSULIN (HCC): ICD-10-CM

## 2024-11-06 LAB
ALBUMIN: 4.3 G/DL (ref 3.5–5.2)
ALP BLD-CCNC: 63 U/L (ref 35–104)
ALT SERPL-CCNC: 9 U/L (ref 0–32)
ANION GAP SERPL CALCULATED.3IONS-SCNC: 11 MMOL/L (ref 7–16)
AST SERPL-CCNC: 14 U/L (ref 0–31)
BASOPHILS ABSOLUTE: 0.03 K/UL (ref 0–0.2)
BASOPHILS RELATIVE PERCENT: 1 % (ref 0–2)
BILIRUB SERPL-MCNC: 0.4 MG/DL (ref 0–1.2)
BUN BLDV-MCNC: 30 MG/DL (ref 6–23)
CALCIUM SERPL-MCNC: 9.7 MG/DL (ref 8.6–10.2)
CHLORIDE BLD-SCNC: 106 MMOL/L (ref 98–107)
CHOLESTEROL, TOTAL: 136 MG/DL
CO2: 25 MMOL/L (ref 22–29)
CREAT SERPL-MCNC: 1.3 MG/DL (ref 0.5–1)
EOSINOPHILS ABSOLUTE: 0.18 K/UL (ref 0.05–0.5)
EOSINOPHILS RELATIVE PERCENT: 4 % (ref 0–6)
GFR, ESTIMATED: 44 ML/MIN/1.73M2
GLUCOSE BLD-MCNC: 93 MG/DL (ref 74–99)
HBA1C MFR BLD: 5.8 % (ref 4–5.6)
HCT VFR BLD CALC: 35 % (ref 34–48)
HDLC SERPL-MCNC: 50 MG/DL
HEMOGLOBIN: 11.2 G/DL (ref 11.5–15.5)
IMMATURE GRANULOCYTES %: 0 % (ref 0–5)
IMMATURE GRANULOCYTES ABSOLUTE: <0.03 K/UL (ref 0–0.58)
LDL CHOLESTEROL: 68 MG/DL
LYMPHOCYTES ABSOLUTE: 1.37 K/UL (ref 1.5–4)
LYMPHOCYTES RELATIVE PERCENT: 33 % (ref 20–42)
MCH RBC QN AUTO: 31.5 PG (ref 26–35)
MCHC RBC AUTO-ENTMCNC: 32 G/DL (ref 32–34.5)
MCV RBC AUTO: 98.3 FL (ref 80–99.9)
MONOCYTES ABSOLUTE: 0.43 K/UL (ref 0.1–0.95)
MONOCYTES RELATIVE PERCENT: 10 % (ref 2–12)
NEUTROPHILS ABSOLUTE: 2.18 K/UL (ref 1.8–7.3)
NEUTROPHILS RELATIVE PERCENT: 52 % (ref 43–80)
PDW BLD-RTO: 11.9 % (ref 11.5–15)
PLATELET # BLD: 214 K/UL (ref 130–450)
PMV BLD AUTO: 11.8 FL (ref 7–12)
POTASSIUM SERPL-SCNC: 4.8 MMOL/L (ref 3.5–5)
RBC # BLD: 3.56 M/UL (ref 3.5–5.5)
SODIUM BLD-SCNC: 142 MMOL/L (ref 132–146)
TOTAL PROTEIN: 7.2 G/DL (ref 6.4–8.3)
TRIGL SERPL-MCNC: 88 MG/DL
TSH SERPL DL<=0.05 MIU/L-ACNC: 0.61 UIU/ML (ref 0.27–4.2)
VLDLC SERPL CALC-MCNC: 18 MG/DL
WBC # BLD: 4.2 K/UL (ref 4.5–11.5)

## 2024-11-07 ENCOUNTER — OFFICE VISIT (OUTPATIENT)
Dept: FAMILY MEDICINE CLINIC | Age: 74
End: 2024-11-07

## 2024-11-07 VITALS
SYSTOLIC BLOOD PRESSURE: 122 MMHG | HEART RATE: 70 BPM | WEIGHT: 177.6 LBS | BODY MASS INDEX: 27.88 KG/M2 | DIASTOLIC BLOOD PRESSURE: 74 MMHG | OXYGEN SATURATION: 97 % | HEIGHT: 67 IN | TEMPERATURE: 98.2 F

## 2024-11-07 DIAGNOSIS — N18.31 TYPE 2 DIABETES MELLITUS WITH STAGE 3A CHRONIC KIDNEY DISEASE, WITHOUT LONG-TERM CURRENT USE OF INSULIN (HCC): ICD-10-CM

## 2024-11-07 DIAGNOSIS — E11.22 TYPE 2 DIABETES MELLITUS WITH STAGE 3A CHRONIC KIDNEY DISEASE, WITHOUT LONG-TERM CURRENT USE OF INSULIN (HCC): ICD-10-CM

## 2024-11-07 DIAGNOSIS — E03.9 ACQUIRED HYPOTHYROIDISM: ICD-10-CM

## 2024-11-07 DIAGNOSIS — E78.2 MIXED HYPERLIPIDEMIA: ICD-10-CM

## 2024-11-07 DIAGNOSIS — I10 ESSENTIAL HYPERTENSION: ICD-10-CM

## 2024-11-07 RX ORDER — BLOOD-GLUCOSE METER
EACH MISCELLANEOUS
Qty: 1 KIT | Refills: 0 | Status: SHIPPED | OUTPATIENT
Start: 2024-11-07

## 2024-11-07 RX ORDER — LISINOPRIL 10 MG/1
10 TABLET ORAL
Qty: 180 TABLET | Refills: 2 | Status: SHIPPED | OUTPATIENT
Start: 2024-11-07

## 2024-11-07 RX ORDER — METFORMIN HYDROCHLORIDE 500 MG/1
500 TABLET, EXTENDED RELEASE ORAL 2 TIMES DAILY WITH MEALS
Qty: 180 TABLET | Refills: 2 | Status: SHIPPED | OUTPATIENT
Start: 2024-11-07

## 2024-11-07 RX ORDER — LEVOTHYROXINE SODIUM 100 UG/1
100 TABLET ORAL DAILY
Qty: 90 TABLET | Refills: 2 | Status: SHIPPED | OUTPATIENT
Start: 2024-11-07

## 2024-11-07 RX ORDER — LANCING DEVICE/LANCETS
KIT MISCELLANEOUS
Qty: 1 KIT | Refills: 11 | Status: SHIPPED | OUTPATIENT
Start: 2024-11-07

## 2024-11-07 RX ORDER — SIMVASTATIN 20 MG
TABLET ORAL
Qty: 90 TABLET | Refills: 2 | Status: SHIPPED | OUTPATIENT
Start: 2024-11-07

## 2024-11-07 ASSESSMENT — ENCOUNTER SYMPTOMS
CONSTIPATION: 0
COUGH: 0
SINUS PAIN: 0
TROUBLE SWALLOWING: 0
BACK PAIN: 0
NAUSEA: 0
WHEEZING: 0
DIARRHEA: 0
ABDOMINAL PAIN: 0
EYE PAIN: 0
CHEST TIGHTNESS: 0
SHORTNESS OF BREATH: 0
VOMITING: 0
SORE THROAT: 0

## 2024-11-07 ASSESSMENT — PATIENT HEALTH QUESTIONNAIRE - PHQ9
SUM OF ALL RESPONSES TO PHQ QUESTIONS 1-9: 0
1. LITTLE INTEREST OR PLEASURE IN DOING THINGS: NOT AT ALL
SUM OF ALL RESPONSES TO PHQ QUESTIONS 1-9: 0
SUM OF ALL RESPONSES TO PHQ QUESTIONS 1-9: 0
2. FEELING DOWN, DEPRESSED OR HOPELESS: NOT AT ALL
SUM OF ALL RESPONSES TO PHQ QUESTIONS 1-9: 0
SUM OF ALL RESPONSES TO PHQ9 QUESTIONS 1 & 2: 0

## 2024-11-07 NOTE — PROGRESS NOTES
daily  -     Lancets Misc. (ACCU-CHEK FASTCLIX LANCET) KIT; Check glucose once a day.dispense 100 needles  -     lisinopril (PRINIVIL;ZESTRIL) 10 MG tablet; Take 1 tablet by mouth in the morning and 1 tablet in the evening.  -     metFORMIN (GLUCOPHAGE-XR) 500 MG extended release tablet; Take 1 tablet by mouth 2 times daily (with meals)  -     simvastatin (ZOCOR) 20 MG tablet; take 1 tablet by mouth at bedtime  -     CBC with Auto Differential; Future  -     Comprehensive Metabolic Panel; Future  -     Hemoglobin A1C; Future  -     Lipid Panel; Future  -     Microalbumin, Ur; Future    Acquired hypothyroidism  Comments:  continue dose @ 112mcg  labs reviewed and tsh is great  no changes  labs in 6 mos  Orders:  -     levothyroxine (SYNTHROID) 100 MCG tablet; Take 1 tablet by mouth daily  -     TSH; Future    Essential hypertension  Comments:  readings good  cont lisinopril 10mg bid  she needs to be checking it though  walking daily and losing weight  Orders:  -     lisinopril (PRINIVIL;ZESTRIL) 10 MG tablet; Take 1 tablet by mouth in the morning and 1 tablet in the evening.    Mixed hyperlipidemia  Comments:  stable with medications  tolerating simvasttin  continue to watch dit, more sweets lately, trigs are elevated  really good  Orders:  -     simvastatin (ZOCOR) 20 MG tablet; take 1 tablet by mouth at bedtime  -     Lipid Panel; Future        I independently reviewed and updated the chief complaint, HPI, past medical and surgical history, medications, allergies and ROS as entered by the LPN.      Seen by:  Olivia Martinez DO

## 2025-05-08 DIAGNOSIS — E03.9 ACQUIRED HYPOTHYROIDISM: ICD-10-CM

## 2025-05-08 DIAGNOSIS — N18.31 TYPE 2 DIABETES MELLITUS WITH STAGE 3A CHRONIC KIDNEY DISEASE, WITHOUT LONG-TERM CURRENT USE OF INSULIN (HCC): ICD-10-CM

## 2025-05-08 DIAGNOSIS — E11.22 TYPE 2 DIABETES MELLITUS WITH STAGE 3A CHRONIC KIDNEY DISEASE, WITHOUT LONG-TERM CURRENT USE OF INSULIN (HCC): ICD-10-CM

## 2025-05-08 DIAGNOSIS — E78.2 MIXED HYPERLIPIDEMIA: ICD-10-CM

## 2025-05-08 LAB
ALBUMIN: 4.3 G/DL (ref 3.5–5.2)
ALP BLD-CCNC: 64 U/L (ref 35–104)
ALT SERPL-CCNC: 11 U/L (ref 0–35)
ANION GAP SERPL CALCULATED.3IONS-SCNC: 11 MMOL/L (ref 7–16)
AST SERPL-CCNC: 19 U/L (ref 0–35)
BASOPHILS ABSOLUTE: 0.03 K/UL (ref 0–0.2)
BASOPHILS RELATIVE PERCENT: 1 % (ref 0–2)
BILIRUB SERPL-MCNC: 0.5 MG/DL (ref 0–1.2)
BUN BLDV-MCNC: 19 MG/DL (ref 8–23)
CALCIUM SERPL-MCNC: 10 MG/DL (ref 8.8–10.2)
CHLORIDE BLD-SCNC: 104 MMOL/L (ref 98–107)
CHOLESTEROL, TOTAL: 140 MG/DL
CO2: 24 MMOL/L (ref 22–29)
CREAT SERPL-MCNC: 1.2 MG/DL (ref 0.5–1)
CREATININE URINE: 91.3 MG/DL (ref 29–226)
EOSINOPHILS ABSOLUTE: 0.12 K/UL (ref 0.05–0.5)
EOSINOPHILS RELATIVE PERCENT: 3 % (ref 0–6)
GFR, ESTIMATED: 49 ML/MIN/1.73M2
GLUCOSE BLD-MCNC: 96 MG/DL (ref 74–99)
HBA1C MFR BLD: 5.5 % (ref 4–5.6)
HCT VFR BLD CALC: 34.7 % (ref 34–48)
HDLC SERPL-MCNC: 49 MG/DL
HEMOGLOBIN: 10.9 G/DL (ref 11.5–15.5)
IMMATURE GRANULOCYTES %: 0 % (ref 0–5)
IMMATURE GRANULOCYTES ABSOLUTE: <0.03 K/UL (ref 0–0.58)
LDL CHOLESTEROL: 65 MG/DL
LYMPHOCYTES ABSOLUTE: 1.04 K/UL (ref 1.5–4)
LYMPHOCYTES RELATIVE PERCENT: 29 % (ref 20–42)
MCH RBC QN AUTO: 31.4 PG (ref 26–35)
MCHC RBC AUTO-ENTMCNC: 31.4 G/DL (ref 32–34.5)
MCV RBC AUTO: 100 FL (ref 80–99.9)
MICROALBUMIN/CREAT 24H UR: 17 MG/L (ref 0–20)
MICROALBUMIN/CREAT UR-RTO: 19 MCG/MG CREAT (ref 0–30)
MONOCYTES ABSOLUTE: 0.4 K/UL (ref 0.1–0.95)
MONOCYTES RELATIVE PERCENT: 11 % (ref 2–12)
NEUTROPHILS ABSOLUTE: 1.97 K/UL (ref 1.8–7.3)
NEUTROPHILS RELATIVE PERCENT: 55 % (ref 43–80)
PDW BLD-RTO: 12.5 % (ref 11.5–15)
PLATELET # BLD: 214 K/UL (ref 130–450)
PMV BLD AUTO: 11.6 FL (ref 7–12)
POTASSIUM SERPL-SCNC: 5.3 MMOL/L (ref 3.5–5.1)
RBC # BLD: 3.47 M/UL (ref 3.5–5.5)
SODIUM BLD-SCNC: 139 MMOL/L (ref 136–145)
TOTAL PROTEIN: 7.4 G/DL (ref 6.4–8.3)
TRIGL SERPL-MCNC: 132 MG/DL
TSH SERPL DL<=0.05 MIU/L-ACNC: 0.31 UIU/ML (ref 0.27–4.2)
VLDLC SERPL CALC-MCNC: 26 MG/DL
WBC # BLD: 3.6 K/UL (ref 4.5–11.5)

## 2025-05-09 SDOH — HEALTH STABILITY: PHYSICAL HEALTH: ON AVERAGE, HOW MANY DAYS PER WEEK DO YOU ENGAGE IN MODERATE TO STRENUOUS EXERCISE (LIKE A BRISK WALK)?: 3 DAYS

## 2025-05-09 SDOH — HEALTH STABILITY: PHYSICAL HEALTH: ON AVERAGE, HOW MANY MINUTES DO YOU ENGAGE IN EXERCISE AT THIS LEVEL?: 20 MIN

## 2025-05-09 ASSESSMENT — PATIENT HEALTH QUESTIONNAIRE - PHQ9
SUM OF ALL RESPONSES TO PHQ QUESTIONS 1-9: 0
2. FEELING DOWN, DEPRESSED OR HOPELESS: NOT AT ALL
SUM OF ALL RESPONSES TO PHQ QUESTIONS 1-9: 0
1. LITTLE INTEREST OR PLEASURE IN DOING THINGS: NOT AT ALL
1. LITTLE INTEREST OR PLEASURE IN DOING THINGS: NOT AT ALL
2. FEELING DOWN, DEPRESSED OR HOPELESS: NOT AT ALL
SUM OF ALL RESPONSES TO PHQ QUESTIONS 1-9: 0
SUM OF ALL RESPONSES TO PHQ QUESTIONS 1-9: 0
SUM OF ALL RESPONSES TO PHQ9 QUESTIONS 1 & 2: 0

## 2025-05-09 ASSESSMENT — LIFESTYLE VARIABLES
HOW MANY STANDARD DRINKS CONTAINING ALCOHOL DO YOU HAVE ON A TYPICAL DAY: 0
HOW MANY STANDARD DRINKS CONTAINING ALCOHOL DO YOU HAVE ON A TYPICAL DAY: PATIENT DOES NOT DRINK
HOW OFTEN DO YOU HAVE A DRINK CONTAINING ALCOHOL: 1
HOW OFTEN DO YOU HAVE A DRINK CONTAINING ALCOHOL: NEVER
HOW OFTEN DO YOU HAVE SIX OR MORE DRINKS ON ONE OCCASION: 1

## 2025-05-12 ENCOUNTER — OFFICE VISIT (OUTPATIENT)
Dept: FAMILY MEDICINE CLINIC | Age: 75
End: 2025-05-12
Payer: MEDICARE

## 2025-05-12 VITALS
DIASTOLIC BLOOD PRESSURE: 72 MMHG | WEIGHT: 179.6 LBS | SYSTOLIC BLOOD PRESSURE: 132 MMHG | OXYGEN SATURATION: 96 % | BODY MASS INDEX: 28.19 KG/M2 | TEMPERATURE: 98.2 F | HEIGHT: 67 IN | HEART RATE: 74 BPM

## 2025-05-12 VITALS
BODY MASS INDEX: 28.2 KG/M2 | WEIGHT: 179.68 LBS | DIASTOLIC BLOOD PRESSURE: 72 MMHG | TEMPERATURE: 98.2 F | HEART RATE: 74 BPM | HEIGHT: 67 IN | SYSTOLIC BLOOD PRESSURE: 132 MMHG | OXYGEN SATURATION: 96 %

## 2025-05-12 DIAGNOSIS — Z00.00 MEDICARE ANNUAL WELLNESS VISIT, SUBSEQUENT: Primary | ICD-10-CM

## 2025-05-12 DIAGNOSIS — D64.9 ANEMIA, UNSPECIFIED TYPE: ICD-10-CM

## 2025-05-12 DIAGNOSIS — E78.2 MIXED HYPERLIPIDEMIA: ICD-10-CM

## 2025-05-12 DIAGNOSIS — I10 ESSENTIAL HYPERTENSION: ICD-10-CM

## 2025-05-12 DIAGNOSIS — N18.31 TYPE 2 DIABETES MELLITUS WITH STAGE 3A CHRONIC KIDNEY DISEASE, WITHOUT LONG-TERM CURRENT USE OF INSULIN (HCC): Primary | ICD-10-CM

## 2025-05-12 DIAGNOSIS — E11.22 TYPE 2 DIABETES MELLITUS WITH STAGE 3A CHRONIC KIDNEY DISEASE, WITHOUT LONG-TERM CURRENT USE OF INSULIN (HCC): Primary | ICD-10-CM

## 2025-05-12 DIAGNOSIS — E03.9 ACQUIRED HYPOTHYROIDISM: ICD-10-CM

## 2025-05-12 LAB
FERRITIN: 80 NG/ML
FOLATE: >40 NG/ML (ref 4.6–34.8)
VITAMIN B-12: 413 PG/ML (ref 232–1245)

## 2025-05-12 PROCEDURE — 3075F SYST BP GE 130 - 139MM HG: CPT | Performed by: FAMILY MEDICINE

## 2025-05-12 PROCEDURE — G2211 COMPLEX E/M VISIT ADD ON: HCPCS | Performed by: FAMILY MEDICINE

## 2025-05-12 PROCEDURE — 3044F HG A1C LEVEL LT 7.0%: CPT | Performed by: FAMILY MEDICINE

## 2025-05-12 PROCEDURE — 3078F DIAST BP <80 MM HG: CPT | Performed by: FAMILY MEDICINE

## 2025-05-12 PROCEDURE — 1123F ACP DISCUSS/DSCN MKR DOCD: CPT | Performed by: FAMILY MEDICINE

## 2025-05-12 PROCEDURE — 1160F RVW MEDS BY RX/DR IN RCRD: CPT | Performed by: FAMILY MEDICINE

## 2025-05-12 PROCEDURE — 1159F MED LIST DOCD IN RCRD: CPT | Performed by: FAMILY MEDICINE

## 2025-05-12 PROCEDURE — 99214 OFFICE O/P EST MOD 30 MIN: CPT | Performed by: FAMILY MEDICINE

## 2025-05-12 PROCEDURE — G0439 PPPS, SUBSEQ VISIT: HCPCS | Performed by: FAMILY MEDICINE

## 2025-05-12 RX ORDER — LISINOPRIL 10 MG/1
10 TABLET ORAL
Qty: 180 TABLET | Refills: 2 | Status: SHIPPED | OUTPATIENT
Start: 2025-05-12

## 2025-05-12 RX ORDER — SIMVASTATIN 20 MG
TABLET ORAL
Qty: 90 TABLET | Refills: 2 | Status: SHIPPED | OUTPATIENT
Start: 2025-05-12

## 2025-05-12 RX ORDER — NETARSUDIL 0.2 MG/ML
SOLUTION/ DROPS OPHTHALMIC; TOPICAL
COMMUNITY
Start: 2025-04-07

## 2025-05-12 RX ORDER — METFORMIN HYDROCHLORIDE 500 MG/1
500 TABLET, EXTENDED RELEASE ORAL 2 TIMES DAILY WITH MEALS
Qty: 180 TABLET | Refills: 2 | Status: SHIPPED | OUTPATIENT
Start: 2025-05-12

## 2025-05-12 RX ORDER — PREDNISOLONE ACETATE 10 MG/ML
SUSPENSION/ DROPS OPHTHALMIC
COMMUNITY
Start: 2025-04-07

## 2025-05-12 RX ORDER — LEVOTHYROXINE SODIUM 100 UG/1
100 TABLET ORAL DAILY
Qty: 90 TABLET | Refills: 2 | Status: SHIPPED | OUTPATIENT
Start: 2025-05-12

## 2025-05-12 ASSESSMENT — ENCOUNTER SYMPTOMS
COUGH: 0
EYE PAIN: 0
CHEST TIGHTNESS: 0
TROUBLE SWALLOWING: 0
WHEEZING: 0
CONSTIPATION: 0
VOMITING: 0
DIARRHEA: 0
SHORTNESS OF BREATH: 0
SORE THROAT: 0
NAUSEA: 0
ABDOMINAL PAIN: 0
SINUS PAIN: 0
BACK PAIN: 0

## 2025-05-12 NOTE — PROGRESS NOTES
chronic kidney disease, without long-term current use of insulin (HCC) 05/06/2024    Essential hypertension 05/06/2024    Hypothyroidism 05/13/2019    Hyperlipidemia 05/13/2019    Seasonal allergies 05/13/2019    Glaucoma 05/13/2019      Past Surgical History:   Procedure Laterality Date    OVARIAN CYST REMOVAL  1976    TONSILLECTOMY AND ADENOIDECTOMY      TRABECULECTOMY Right 2019      Social History       Tobacco History       Smoking Status  Never      Smokeless Tobacco Use  Never              Alcohol History       Alcohol Use Status  Yes Comment  Occasional              Drug Use       Drug Use Status  Never              Sexual Activity       Sexually Active  Not Asked                /72   Pulse 74   Temp 98.2 °F (36.8 °C)   Ht 1.702 m (5' 7\")   Wt 81.5 kg (179 lb 9.6 oz)   SpO2 96%   BMI 28.13 kg/m²     EXAM:   Physical Exam  Vitals and nursing note reviewed.   Constitutional:       General: She is not in acute distress.     Appearance: She is well-developed. She is not ill-appearing or toxic-appearing.   HENT:      Head: Normocephalic and atraumatic.      Right Ear: Tympanic membrane normal.      Left Ear: Tympanic membrane normal.      Ears:      Comments: There is a lot of cerumen in the right canal     Nose: No congestion.   Eyes:      Pupils: Pupils are equal, round, and reactive to light.   Cardiovascular:      Rate and Rhythm: Normal rate and regular rhythm.   Pulmonary:      Effort: Pulmonary effort is normal. No respiratory distress.      Breath sounds: Normal breath sounds. No wheezing or rhonchi.   Musculoskeletal:      Cervical back: Normal range of motion.      Comments: Gait steady   Skin:     General: Skin is warm and dry.   Neurological:      Mental Status: She is alert and oriented to person, place, and time.   Psychiatric:         Mood and Affect: Mood normal.         Thought Content: Thought content normal.          Philomena was seen today for diabetes, hypothyroidism and

## 2025-05-12 NOTE — PROGRESS NOTES
Medicare Annual Wellness Visit    Philomena Madrigal is here for Medicare AWV    Assessment & Plan   Medicare annual wellness visit, subsequent       Return for Medicare Annual Wellness Visit in 1 year.     Subjective   The following acute and/or chronic problems were also addressed today:  Screenings, vaccines and advance care plans    Patient's complete Health Risk Assessment and screening values have been reviewed and are found in Flowsheets. The following problems were reviewed today and where indicated follow up appointments were made and/or referrals ordered.    Positive Risk Factor Screenings with Interventions:                  Hearing Screen:       Interventions:  Patient declines any further evaluation or treatment     Safety:     Interventions:  Patient declined any further interventions or treatment     Advanced Directives:       Intervention:  has NO advanced directive - information provided                     Objective   Vitals:    05/12/25 1145   BP: 132/72   Pulse: 74   Temp: 98.2 °F (36.8 °C)   SpO2: 96%   Weight: 81.5 kg (179 lb 10.8 oz)   Height: 1.702 m (5' 7\")      Body mass index is 28.14 kg/m².      General Appearance: alert and oriented to person, place and time, well developed and well- nourished, in no acute distress  Skin: warm and dry, no rash or erythema  Head: normocephalic and atraumatic  Eyes: pupils equal, round, and reactive to light, extraocular eye movements intact, conjunctivae normal  ENT: tympanic membrane, external ear and ear canal normal bilaterally, nose without deformity, nasal mucosa and turbinates normal without polyps  Neck: supple and non-tender without mass, no thyromegaly or thyroid nodules, no cervical lymphadenopathy  Pulmonary/Chest: clear to auscultation bilaterally- no wheezes, rales or rhonchi, normal air movement, no respiratory distress  Cardiovascular: normal rate, regular rhythm, normal S1 and S2, no murmurs, rubs, clicks, or gallops, distal pulses intact, no

## 2025-05-13 ENCOUNTER — RESULTS FOLLOW-UP (OUTPATIENT)
Dept: FAMILY MEDICINE CLINIC | Age: 75
End: 2025-05-13